# Patient Record
Sex: FEMALE | Race: WHITE | NOT HISPANIC OR LATINO | Employment: UNEMPLOYED | URBAN - METROPOLITAN AREA
[De-identification: names, ages, dates, MRNs, and addresses within clinical notes are randomized per-mention and may not be internally consistent; named-entity substitution may affect disease eponyms.]

---

## 2021-01-04 ENCOUNTER — HOSPITAL ENCOUNTER (EMERGENCY)
Facility: OTHER | Age: 31
Discharge: HOME | End: 2021-01-05
Attending: EMERGENCY MEDICINE
Payer: MEDICAID

## 2021-01-04 DIAGNOSIS — S56.922A: Primary | ICD-10-CM

## 2021-01-04 DIAGNOSIS — T14.90XA TRAUMA: ICD-10-CM

## 2021-01-04 DIAGNOSIS — S56.522A LACERATION OF EXTENSOR TENDON OF LEFT FOREARM, INITIAL ENCOUNTER: ICD-10-CM

## 2021-01-04 LAB
B-HCG UR QL: NEGATIVE
CTP QC/QA: YES

## 2021-01-04 PROCEDURE — 12002 RPR S/N/AX/GEN/TRNK2.6-7.5CM: CPT

## 2021-01-04 PROCEDURE — 90471 IMMUNIZATION ADMIN: CPT | Performed by: EMERGENCY MEDICINE

## 2021-01-04 PROCEDURE — 81025 URINE PREGNANCY TEST: CPT | Performed by: EMERGENCY MEDICINE

## 2021-01-04 PROCEDURE — 63600175 PHARM REV CODE 636 W HCPCS: Performed by: EMERGENCY MEDICINE

## 2021-01-04 PROCEDURE — 25000003 PHARM REV CODE 250: Performed by: EMERGENCY MEDICINE

## 2021-01-04 PROCEDURE — 63600175 PHARM REV CODE 636 W HCPCS: Performed by: NURSE PRACTITIONER

## 2021-01-04 PROCEDURE — 90715 TDAP VACCINE 7 YRS/> IM: CPT | Performed by: EMERGENCY MEDICINE

## 2021-01-04 PROCEDURE — 99284 EMERGENCY DEPT VISIT MOD MDM: CPT | Mod: 25

## 2021-01-04 PROCEDURE — 96374 THER/PROPH/DIAG INJ IV PUSH: CPT

## 2021-01-04 PROCEDURE — 96375 TX/PRO/DX INJ NEW DRUG ADDON: CPT

## 2021-01-04 RX ORDER — LIDOCAINE HYDROCHLORIDE AND EPINEPHRINE 20; 10 MG/ML; UG/ML
10 INJECTION, SOLUTION INFILTRATION; PERINEURAL
Status: COMPLETED | OUTPATIENT
Start: 2021-01-04 | End: 2021-01-04

## 2021-01-04 RX ORDER — HYDROMORPHONE HYDROCHLORIDE 1 MG/ML
0.5 INJECTION, SOLUTION INTRAMUSCULAR; INTRAVENOUS; SUBCUTANEOUS
Status: COMPLETED | OUTPATIENT
Start: 2021-01-04 | End: 2021-01-04

## 2021-01-04 RX ADMIN — HYDROMORPHONE HYDROCHLORIDE 0.5 MG: 1 INJECTION, SOLUTION INTRAMUSCULAR; INTRAVENOUS; SUBCUTANEOUS at 10:01

## 2021-01-04 RX ADMIN — CLOSTRIDIUM TETANI TOXOID ANTIGEN (FORMALDEHYDE INACTIVATED), CORYNEBACTERIUM DIPHTHERIAE TOXOID ANTIGEN (FORMALDEHYDE INACTIVATED), BORDETELLA PERTUSSIS TOXOID ANTIGEN (GLUTARALDEHYDE INACTIVATED), BORDETELLA PERTUSSIS FILAMENTOUS HEMAGGLUTININ ANTIGEN (FORMALDEHYDE INACTIVATED), BORDETELLA PERTUSSIS PERTACTIN ANTIGEN, AND BORDETELLA PERTUSSIS FIMBRIAE 2/3 ANTIGEN 0.5 ML: 5; 2; 2.5; 5; 3; 5 INJECTION, SUSPENSION INTRAMUSCULAR at 10:01

## 2021-01-04 RX ADMIN — LIDOCAINE-EPINEPHRINE-TETRACAINE GEL 4-0.05-0.5% 1 ML: 4-0.05-0.5 GEL at 11:01

## 2021-01-04 RX ADMIN — LORAZEPAM 1 MG: 2 INJECTION INTRAMUSCULAR; INTRAVENOUS at 11:01

## 2021-01-04 RX ADMIN — LIDOCAINE HYDROCHLORIDE,EPINEPHRINE BITARTRATE 10 ML: 20; .01 INJECTION, SOLUTION INFILTRATION; PERINEURAL at 10:01

## 2021-01-05 ENCOUNTER — TELEPHONE (OUTPATIENT)
Dept: ORTHOPEDICS | Facility: CLINIC | Age: 31
End: 2021-01-05

## 2021-01-05 VITALS
DIASTOLIC BLOOD PRESSURE: 70 MMHG | HEART RATE: 78 BPM | TEMPERATURE: 99 F | RESPIRATION RATE: 18 BRPM | OXYGEN SATURATION: 98 % | SYSTOLIC BLOOD PRESSURE: 159 MMHG

## 2021-01-05 PROCEDURE — 12002 RPR S/N/AX/GEN/TRNK2.6-7.5CM: CPT

## 2021-01-05 RX ORDER — OXYCODONE AND ACETAMINOPHEN 5; 325 MG/1; MG/1
1 TABLET ORAL EVERY 4 HOURS PRN
Qty: 8 TABLET | Refills: 0 | Status: ON HOLD | OUTPATIENT
Start: 2021-01-05 | End: 2021-01-08

## 2021-01-06 DIAGNOSIS — S51.802A FLEXOR TENDON LACERATION OF FOREARM WITH OPEN WOUND, LEFT, INITIAL ENCOUNTER: Primary | ICD-10-CM

## 2021-01-06 DIAGNOSIS — S56.222A: ICD-10-CM

## 2021-01-06 DIAGNOSIS — S56.222A FLEXOR TENDON LACERATION OF FOREARM WITH OPEN WOUND, LEFT, INITIAL ENCOUNTER: Primary | ICD-10-CM

## 2021-01-06 DIAGNOSIS — S51.802A: ICD-10-CM

## 2021-01-08 ENCOUNTER — ANESTHESIA (OUTPATIENT)
Dept: SURGERY | Facility: HOSPITAL | Age: 31
End: 2021-01-08
Payer: MEDICAID

## 2021-01-08 ENCOUNTER — ANESTHESIA EVENT (OUTPATIENT)
Dept: SURGERY | Facility: HOSPITAL | Age: 31
End: 2021-01-08
Payer: MEDICAID

## 2021-01-08 ENCOUNTER — HOSPITAL ENCOUNTER (OUTPATIENT)
Facility: HOSPITAL | Age: 31
Discharge: HOME | End: 2021-01-08
Attending: ORTHOPAEDIC SURGERY | Admitting: ORTHOPAEDIC SURGERY
Payer: MEDICAID

## 2021-01-08 VITALS
BODY MASS INDEX: 28.35 KG/M2 | SYSTOLIC BLOOD PRESSURE: 134 MMHG | WEIGHT: 160 LBS | RESPIRATION RATE: 16 BRPM | HEART RATE: 72 BPM | TEMPERATURE: 98 F | OXYGEN SATURATION: 98 % | HEIGHT: 63 IN | DIASTOLIC BLOOD PRESSURE: 74 MMHG

## 2021-01-08 DIAGNOSIS — S56.222A: ICD-10-CM

## 2021-01-08 DIAGNOSIS — S51.802A FLEXOR TENDON LACERATION OF FOREARM WITH OPEN WOUND, LEFT, INITIAL ENCOUNTER: ICD-10-CM

## 2021-01-08 DIAGNOSIS — S51.802A: ICD-10-CM

## 2021-01-08 DIAGNOSIS — S56.222A FLEXOR TENDON LACERATION OF FOREARM WITH OPEN WOUND, LEFT, INITIAL ENCOUNTER: ICD-10-CM

## 2021-01-08 LAB
ANION GAP SERPL CALC-SCNC: 7 MMOL/L (ref 8–16)
B-HCG UR QL: NEGATIVE
BUN SERPL-MCNC: 10 MG/DL (ref 6–20)
CALCIUM SERPL-MCNC: 8.2 MG/DL (ref 8.7–10.5)
CHLORIDE SERPL-SCNC: 105 MMOL/L (ref 95–110)
CO2 SERPL-SCNC: 24 MMOL/L (ref 23–29)
CREAT SERPL-MCNC: 0.7 MG/DL (ref 0.5–1.4)
CTP QC/QA: YES
EST. GFR  (AFRICAN AMERICAN): >60 ML/MIN/1.73 M^2
EST. GFR  (NON AFRICAN AMERICAN): >60 ML/MIN/1.73 M^2
GLUCOSE SERPL-MCNC: 75 MG/DL (ref 70–110)
POTASSIUM SERPL-SCNC: 3.9 MMOL/L (ref 3.5–5.1)
SARS-COV-2 RDRP RESP QL NAA+PROBE: NEGATIVE
SODIUM SERPL-SCNC: 136 MMOL/L (ref 136–145)

## 2021-01-08 PROCEDURE — 36415 COLL VENOUS BLD VENIPUNCTURE: CPT

## 2021-01-08 PROCEDURE — 37000008 HC ANESTHESIA 1ST 15 MINUTES: Performed by: ORTHOPAEDIC SURGERY

## 2021-01-08 PROCEDURE — 01810 ANES PX NRV MUSC F/ARM WRST: CPT | Performed by: ORTHOPAEDIC SURGERY

## 2021-01-08 PROCEDURE — 26350 PR REPAIR FLEXOR TENDON,HAND,W/O GRAFT,EA: ICD-10-PCS | Mod: 51,F2,, | Performed by: ORTHOPAEDIC SURGERY

## 2021-01-08 PROCEDURE — 87015 SPECIMEN INFECT AGNT CONCNTJ: CPT

## 2021-01-08 PROCEDURE — 27200664 HC NERVE BLOCK COMPLETE KIT: Performed by: STUDENT IN AN ORGANIZED HEALTH CARE EDUCATION/TRAINING PROGRAM

## 2021-01-08 PROCEDURE — 25260 REPAIR FOREARM TENDON/MUSCLE: CPT | Mod: 51,LT,, | Performed by: ORTHOPAEDIC SURGERY

## 2021-01-08 PROCEDURE — 63600175 PHARM REV CODE 636 W HCPCS: Performed by: ORTHOPAEDIC SURGERY

## 2021-01-08 PROCEDURE — 87205 SMEAR GRAM STAIN: CPT

## 2021-01-08 PROCEDURE — 76942 ECHO GUIDE FOR BIOPSY: CPT | Performed by: STUDENT IN AN ORGANIZED HEALTH CARE EDUCATION/TRAINING PROGRAM

## 2021-01-08 PROCEDURE — 71000015 HC POSTOP RECOV 1ST HR: Performed by: ORTHOPAEDIC SURGERY

## 2021-01-08 PROCEDURE — 87075 CULTR BACTERIA EXCEPT BLOOD: CPT

## 2021-01-08 PROCEDURE — U0002 COVID-19 LAB TEST NON-CDC: HCPCS

## 2021-01-08 PROCEDURE — 87102 FUNGUS ISOLATION CULTURE: CPT

## 2021-01-08 PROCEDURE — 80048 BASIC METABOLIC PNL TOTAL CA: CPT

## 2021-01-08 PROCEDURE — 25260 PR REFOREARM TEND/MUSC,FLEX,PRIM,EA: ICD-10-PCS | Mod: 51,LT,, | Performed by: ORTHOPAEDIC SURGERY

## 2021-01-08 PROCEDURE — 71000016 HC POSTOP RECOV ADDL HR: Performed by: ORTHOPAEDIC SURGERY

## 2021-01-08 PROCEDURE — 87070 CULTURE OTHR SPECIMN AEROBIC: CPT

## 2021-01-08 PROCEDURE — 36000706: Performed by: ORTHOPAEDIC SURGERY

## 2021-01-08 PROCEDURE — 26350 REPAIR FINGER/HAND TENDON: CPT | Mod: F1,,, | Performed by: ORTHOPAEDIC SURGERY

## 2021-01-08 PROCEDURE — 87116 MYCOBACTERIA CULTURE: CPT

## 2021-01-08 PROCEDURE — 63600175 PHARM REV CODE 636 W HCPCS: Performed by: STUDENT IN AN ORGANIZED HEALTH CARE EDUCATION/TRAINING PROGRAM

## 2021-01-08 PROCEDURE — 37000009 HC ANESTHESIA EA ADD 15 MINS: Performed by: ORTHOPAEDIC SURGERY

## 2021-01-08 PROCEDURE — 25000003 PHARM REV CODE 250: Performed by: STUDENT IN AN ORGANIZED HEALTH CARE EDUCATION/TRAINING PROGRAM

## 2021-01-08 PROCEDURE — 36000707: Performed by: ORTHOPAEDIC SURGERY

## 2021-01-08 PROCEDURE — 81025 URINE PREGNANCY TEST: CPT | Performed by: ORTHOPAEDIC SURGERY

## 2021-01-08 PROCEDURE — 63600175 PHARM REV CODE 636 W HCPCS

## 2021-01-08 PROCEDURE — 87206 SMEAR FLUORESCENT/ACID STAI: CPT

## 2021-01-08 RX ORDER — CEFAZOLIN SODIUM 2 G/50ML
2 SOLUTION INTRAVENOUS
Status: DISCONTINUED | OUTPATIENT
Start: 2021-01-08 | End: 2021-01-08 | Stop reason: HOSPADM

## 2021-01-08 RX ORDER — LORAZEPAM 2 MG/ML
1 INJECTION INTRAMUSCULAR ONCE
Status: DISCONTINUED | OUTPATIENT
Start: 2021-01-08 | End: 2021-01-08 | Stop reason: HOSPADM

## 2021-01-08 RX ORDER — OXYCODONE HYDROCHLORIDE 5 MG/1
5 TABLET ORAL EVERY 4 HOURS PRN
Qty: 40 TABLET | Refills: 0 | Status: SHIPPED | OUTPATIENT
Start: 2021-01-08

## 2021-01-08 RX ORDER — OXYCODONE AND ACETAMINOPHEN 5; 325 MG/1; MG/1
1 TABLET ORAL EVERY 4 HOURS PRN
Qty: 8 TABLET | Refills: 0 | Status: SHIPPED | OUTPATIENT
Start: 2021-01-08 | End: 2021-01-10

## 2021-01-08 RX ORDER — OXYCODONE HYDROCHLORIDE 5 MG/1
5 TABLET ORAL EVERY 4 HOURS PRN
Status: CANCELLED | OUTPATIENT
Start: 2021-01-08

## 2021-01-08 RX ORDER — OXYCODONE HYDROCHLORIDE 5 MG/1
10 TABLET ORAL EVERY 4 HOURS PRN
Status: CANCELLED | OUTPATIENT
Start: 2021-01-08

## 2021-01-08 RX ORDER — ONDANSETRON 8 MG/1
8 TABLET, ORALLY DISINTEGRATING ORAL EVERY 8 HOURS PRN
Status: CANCELLED | OUTPATIENT
Start: 2021-01-08

## 2021-01-08 RX ORDER — SODIUM CHLORIDE, SODIUM LACTATE, POTASSIUM CHLORIDE, CALCIUM CHLORIDE 600; 310; 30; 20 MG/100ML; MG/100ML; MG/100ML; MG/100ML
INJECTION, SOLUTION INTRAVENOUS CONTINUOUS PRN
Status: DISCONTINUED | OUTPATIENT
Start: 2021-01-08 | End: 2021-01-08

## 2021-01-08 RX ORDER — PROPOFOL 10 MG/ML
VIAL (ML) INTRAVENOUS CONTINUOUS PRN
Status: DISCONTINUED | OUTPATIENT
Start: 2021-01-08 | End: 2021-01-08

## 2021-01-08 RX ORDER — LORAZEPAM 2 MG/ML
INJECTION INTRAMUSCULAR
Status: COMPLETED
Start: 2021-01-08 | End: 2021-01-08

## 2021-01-08 RX ORDER — KETAMINE HCL IN 0.9 % NACL 50 MG/5 ML
SYRINGE (ML) INTRAVENOUS
Status: DISCONTINUED | OUTPATIENT
Start: 2021-01-08 | End: 2021-01-08

## 2021-01-08 RX ADMIN — SODIUM CHLORIDE, SODIUM LACTATE, POTASSIUM CHLORIDE, AND CALCIUM CHLORIDE: .6; .31; .03; .02 INJECTION, SOLUTION INTRAVENOUS at 01:01

## 2021-01-08 RX ADMIN — SODIUM CHLORIDE, SODIUM LACTATE, POTASSIUM CHLORIDE, AND CALCIUM CHLORIDE: .6; .31; .03; .02 INJECTION, SOLUTION INTRAVENOUS at 02:01

## 2021-01-08 RX ADMIN — Medication 50 MG: at 02:01

## 2021-01-08 RX ADMIN — PROPOFOL 250 MCG/KG/MIN: 10 INJECTION, EMULSION INTRAVENOUS at 01:01

## 2021-01-08 RX ADMIN — CEFAZOLIN SODIUM 2 G: 2 SOLUTION INTRAVENOUS at 02:01

## 2021-01-08 RX ADMIN — LORAZEPAM 1 MG: 2 INJECTION INTRAMUSCULAR; INTRAVENOUS at 03:01

## 2021-01-09 LAB
GRAM STN SPEC: NORMAL
GRAM STN SPEC: NORMAL

## 2021-01-11 LAB — BACTERIA SPEC AEROBE CULT: NORMAL

## 2021-01-13 LAB — BACTERIA SPEC ANAEROBE CULT: NORMAL

## 2021-01-20 ENCOUNTER — OFFICE VISIT (OUTPATIENT)
Dept: ORTHOPEDICS | Facility: CLINIC | Age: 31
End: 2021-01-20
Payer: MEDICAID

## 2021-01-20 VITALS
DIASTOLIC BLOOD PRESSURE: 83 MMHG | BODY MASS INDEX: 28.36 KG/M2 | WEIGHT: 160.06 LBS | SYSTOLIC BLOOD PRESSURE: 119 MMHG | HEIGHT: 63 IN | HEART RATE: 85 BPM

## 2021-01-20 DIAGNOSIS — S51.802A FLEXOR TENDON LACERATION OF LEFT FOREARM WITH OPEN WOUND, INITIAL ENCOUNTER: Primary | ICD-10-CM

## 2021-01-20 DIAGNOSIS — S56.222A FLEXOR TENDON LACERATION OF LEFT FOREARM WITH OPEN WOUND, INITIAL ENCOUNTER: Primary | ICD-10-CM

## 2021-01-20 PROCEDURE — 99999 PR PBB SHADOW E&M-EST. PATIENT-LVL III: CPT | Mod: PBBFAC,,, | Performed by: PHYSICIAN ASSISTANT

## 2021-01-20 PROCEDURE — 99213 OFFICE O/P EST LOW 20 MIN: CPT | Mod: PBBFAC,PN | Performed by: PHYSICIAN ASSISTANT

## 2021-01-20 PROCEDURE — 99999 PR PBB SHADOW E&M-EST. PATIENT-LVL III: ICD-10-PCS | Mod: PBBFAC,,, | Performed by: PHYSICIAN ASSISTANT

## 2021-01-20 PROCEDURE — 99024 POSTOP FOLLOW-UP VISIT: CPT | Mod: ,,, | Performed by: PHYSICIAN ASSISTANT

## 2021-01-20 PROCEDURE — 99024 PR POST-OP FOLLOW-UP VISIT: ICD-10-PCS | Mod: ,,, | Performed by: PHYSICIAN ASSISTANT

## 2021-02-08 LAB — FUNGUS SPEC CULT: NORMAL

## 2021-03-13 LAB
ACID FAST MOD KINY STN SPEC: NORMAL
MYCOBACTERIUM SPEC QL CULT: NORMAL

## 2025-01-29 ENCOUNTER — HOSPITAL ENCOUNTER (OUTPATIENT)
Facility: OTHER | Age: 35
Discharge: LEFT BEFORE DISCHARGE | End: 2025-01-30
Attending: EMERGENCY MEDICINE | Admitting: EMERGENCY MEDICINE
Payer: MEDICAID

## 2025-01-29 DIAGNOSIS — L02.01 FACIAL ABSCESS: Primary | ICD-10-CM

## 2025-01-29 DIAGNOSIS — K12.2 SUBMANDIBULAR ABSCESS: ICD-10-CM

## 2025-01-29 DIAGNOSIS — L02.11 NECK ABSCESS: ICD-10-CM

## 2025-01-29 LAB
ALBUMIN SERPL BCP-MCNC: 2.9 G/DL (ref 3.5–5.2)
ALP SERPL-CCNC: 146 U/L (ref 40–150)
ALT SERPL W/O P-5'-P-CCNC: 17 U/L (ref 10–44)
ANION GAP SERPL CALC-SCNC: 9 MMOL/L (ref 8–16)
AST SERPL-CCNC: 23 U/L (ref 10–40)
BASOPHILS # BLD AUTO: 0.02 K/UL (ref 0–0.2)
BASOPHILS NFR BLD: 0.3 % (ref 0–1.9)
BILIRUB SERPL-MCNC: 0.4 MG/DL (ref 0.1–1)
BUN SERPL-MCNC: 15 MG/DL (ref 6–20)
CALCIUM SERPL-MCNC: 9.4 MG/DL (ref 8.7–10.5)
CHLORIDE SERPL-SCNC: 99 MMOL/L (ref 95–110)
CO2 SERPL-SCNC: 26 MMOL/L (ref 23–29)
CREAT SERPL-MCNC: 0.7 MG/DL (ref 0.5–1.4)
CRP SERPL-MCNC: 78.7 MG/L (ref 0–8.2)
DIFFERENTIAL METHOD BLD: ABNORMAL
EOSINOPHIL # BLD AUTO: 0 K/UL (ref 0–0.5)
EOSINOPHIL NFR BLD: 0.3 % (ref 0–8)
ERYTHROCYTE [DISTWIDTH] IN BLOOD BY AUTOMATED COUNT: 13.2 % (ref 11.5–14.5)
EST. GFR  (NO RACE VARIABLE): >60 ML/MIN/1.73 M^2
GLUCOSE SERPL-MCNC: 97 MG/DL (ref 70–110)
HCT VFR BLD AUTO: 33.7 % (ref 37–48.5)
HCV AB SERPL QL IA: POSITIVE
HGB BLD-MCNC: 11.6 G/DL (ref 12–16)
HIV 1+2 AB+HIV1 P24 AG SERPL QL IA: POSITIVE
IMM GRANULOCYTES # BLD AUTO: 0.04 K/UL (ref 0–0.04)
IMM GRANULOCYTES NFR BLD AUTO: 0.6 % (ref 0–0.5)
LYMPHOCYTES # BLD AUTO: 0.8 K/UL (ref 1–4.8)
LYMPHOCYTES NFR BLD: 13.1 % (ref 18–48)
MCH RBC QN AUTO: 27.7 PG (ref 27–31)
MCHC RBC AUTO-ENTMCNC: 34.4 G/DL (ref 32–36)
MCV RBC AUTO: 80 FL (ref 82–98)
MONOCYTES # BLD AUTO: 0.3 K/UL (ref 0.3–1)
MONOCYTES NFR BLD: 4.7 % (ref 4–15)
NEUTROPHILS # BLD AUTO: 5 K/UL (ref 1.8–7.7)
NEUTROPHILS NFR BLD: 81 % (ref 38–73)
NRBC BLD-RTO: 0 /100 WBC
PLATELET # BLD AUTO: 247 K/UL (ref 150–450)
PMV BLD AUTO: 8.2 FL (ref 9.2–12.9)
POTASSIUM SERPL-SCNC: 3.5 MMOL/L (ref 3.5–5.1)
PROT SERPL-MCNC: 9.4 G/DL (ref 6–8.4)
RBC # BLD AUTO: 4.19 M/UL (ref 4–5.4)
SODIUM SERPL-SCNC: 134 MMOL/L (ref 136–145)
WBC # BLD AUTO: 6.16 K/UL (ref 3.9–12.7)

## 2025-01-29 PROCEDURE — 86803 HEPATITIS C AB TEST: CPT | Performed by: EMERGENCY MEDICINE

## 2025-01-29 PROCEDURE — 99285 EMERGENCY DEPT VISIT HI MDM: CPT

## 2025-01-29 PROCEDURE — 84702 CHORIONIC GONADOTROPIN TEST: CPT | Performed by: EMERGENCY MEDICINE

## 2025-01-29 PROCEDURE — 85025 COMPLETE CBC W/AUTO DIFF WBC: CPT | Performed by: NURSE PRACTITIONER

## 2025-01-29 PROCEDURE — 80053 COMPREHEN METABOLIC PANEL: CPT | Performed by: NURSE PRACTITIONER

## 2025-01-29 PROCEDURE — 87389 HIV-1 AG W/HIV-1&-2 AB AG IA: CPT | Mod: 91 | Performed by: EMERGENCY MEDICINE

## 2025-01-29 PROCEDURE — 87522 HEPATITIS C REVRS TRNSCRPJ: CPT | Performed by: EMERGENCY MEDICINE

## 2025-01-29 PROCEDURE — 87040 BLOOD CULTURE FOR BACTERIA: CPT | Mod: 59

## 2025-01-29 PROCEDURE — 87389 HIV-1 AG W/HIV-1&-2 AB AG IA: CPT | Performed by: EMERGENCY MEDICINE

## 2025-01-29 PROCEDURE — 86140 C-REACTIVE PROTEIN: CPT | Performed by: NURSE PRACTITIONER

## 2025-01-29 RX ORDER — HYDROMORPHONE HYDROCHLORIDE 1 MG/ML
1 INJECTION, SOLUTION INTRAMUSCULAR; INTRAVENOUS; SUBCUTANEOUS
Status: COMPLETED | OUTPATIENT
Start: 2025-01-29 | End: 2025-01-30

## 2025-01-29 RX ORDER — DEXAMETHASONE SODIUM PHOSPHATE 4 MG/ML
12 INJECTION, SOLUTION INTRA-ARTICULAR; INTRALESIONAL; INTRAMUSCULAR; INTRAVENOUS; SOFT TISSUE
Status: COMPLETED | OUTPATIENT
Start: 2025-01-29 | End: 2025-01-30

## 2025-01-29 NOTE — Clinical Note
Diagnosis: Facial abscess [926086]   Future Attending Provider: AZAM MEJÍA [[id number]]   Is the patient being sent to ED Observation?: No   Special Needs:: No Special Needs [1]

## 2025-01-30 ENCOUNTER — ANESTHESIA EVENT (OUTPATIENT)
Dept: SURGERY | Facility: OTHER | Age: 35
End: 2025-01-30
Payer: MEDICAID

## 2025-01-30 ENCOUNTER — ANESTHESIA (OUTPATIENT)
Dept: SURGERY | Facility: OTHER | Age: 35
End: 2025-01-30
Payer: MEDICAID

## 2025-01-30 VITALS
RESPIRATION RATE: 18 BRPM | HEIGHT: 62 IN | WEIGHT: 130.06 LBS | HEART RATE: 60 BPM | BODY MASS INDEX: 23.93 KG/M2 | SYSTOLIC BLOOD PRESSURE: 149 MMHG | DIASTOLIC BLOOD PRESSURE: 78 MMHG | OXYGEN SATURATION: 96 % | TEMPERATURE: 98 F

## 2025-01-30 PROBLEM — B19.20 HEPATITIS C VIRUS INFECTION, UNSPECIFIED CHRONICITY: Status: ACTIVE | Noted: 2021-07-01

## 2025-01-30 PROBLEM — F32.A DEPRESSIVE DISORDER: Status: ACTIVE | Noted: 2025-01-30

## 2025-01-30 PROBLEM — K12.2 SUBMANDIBULAR ABSCESS: Status: ACTIVE | Noted: 2025-01-30

## 2025-01-30 PROBLEM — F19.90 IVDU (INTRAVENOUS DRUG USER): Status: ACTIVE | Noted: 2021-07-01

## 2025-01-30 PROBLEM — F20.9 SCHIZOPHRENIA: Status: ACTIVE | Noted: 2025-01-30

## 2025-01-30 PROBLEM — Z21 HIV (HUMAN IMMUNODEFICIENCY VIRUS INFECTION): Status: ACTIVE | Noted: 2021-12-09

## 2025-01-30 PROBLEM — F19.11 HISTORY OF DRUG ABUSE: Status: ACTIVE | Noted: 2025-01-30

## 2025-01-30 PROBLEM — Z86.79 H/O ENDOCARDITIS: Status: ACTIVE | Noted: 2021-10-26

## 2025-01-30 PROBLEM — F41.9 ANXIETY: Status: ACTIVE | Noted: 2025-01-30

## 2025-01-30 LAB
HCG INTACT+B SERPL-ACNC: <1.2 MIU/ML
HCV RNA SERPL NAA+PROBE-LOG IU: 6.62 LOGIU/ML
HCV RNA SERPL QL NAA+PROBE: DETECTED
HCV RNA SPEC NAA+PROBE-ACNC: ABNORMAL IU/ML
HIV SUPPLEMENTAL ASSAY INTERPRETATION: ABNORMAL
HIV-1 RESULT: POSITIVE
HIV-2 RESULT: NEGATIVE

## 2025-01-30 PROCEDURE — 25500020 PHARM REV CODE 255

## 2025-01-30 PROCEDURE — 63600175 PHARM REV CODE 636 W HCPCS: Performed by: INTERNAL MEDICINE

## 2025-01-30 PROCEDURE — 87116 MYCOBACTERIA CULTURE: CPT | Performed by: STUDENT IN AN ORGANIZED HEALTH CARE EDUCATION/TRAINING PROGRAM

## 2025-01-30 PROCEDURE — 96375 TX/PRO/DX INJ NEW DRUG ADDON: CPT

## 2025-01-30 PROCEDURE — 25000003 PHARM REV CODE 250: Performed by: NURSE ANESTHETIST, CERTIFIED REGISTERED

## 2025-01-30 PROCEDURE — 25000003 PHARM REV CODE 250: Performed by: INTERNAL MEDICINE

## 2025-01-30 PROCEDURE — 96372 THER/PROPH/DIAG INJ SC/IM: CPT | Performed by: INTERNAL MEDICINE

## 2025-01-30 PROCEDURE — 36410 VNPNXR 3YR/> PHY/QHP DX/THER: CPT

## 2025-01-30 PROCEDURE — 96365 THER/PROPH/DIAG IV INF INIT: CPT | Mod: 59

## 2025-01-30 PROCEDURE — 96366 THER/PROPH/DIAG IV INF ADDON: CPT

## 2025-01-30 PROCEDURE — 38510 BIOPSY/REMOVAL LYMPH NODES: CPT | Mod: RT,,, | Performed by: STUDENT IN AN ORGANIZED HEALTH CARE EDUCATION/TRAINING PROGRAM

## 2025-01-30 PROCEDURE — 63600175 PHARM REV CODE 636 W HCPCS: Performed by: STUDENT IN AN ORGANIZED HEALTH CARE EDUCATION/TRAINING PROGRAM

## 2025-01-30 PROCEDURE — 36000705 HC OR TIME LEV I EA ADD 15 MIN: Performed by: STUDENT IN AN ORGANIZED HEALTH CARE EDUCATION/TRAINING PROGRAM

## 2025-01-30 PROCEDURE — 25000003 PHARM REV CODE 250: Performed by: PHYSICIAN ASSISTANT

## 2025-01-30 PROCEDURE — G0378 HOSPITAL OBSERVATION PER HR: HCPCS

## 2025-01-30 PROCEDURE — 88305 TISSUE EXAM BY PATHOLOGIST: CPT | Mod: 26,,, | Performed by: PATHOLOGY

## 2025-01-30 PROCEDURE — 25000003 PHARM REV CODE 250

## 2025-01-30 PROCEDURE — 63600175 PHARM REV CODE 636 W HCPCS: Performed by: PHYSICIAN ASSISTANT

## 2025-01-30 PROCEDURE — 63600175 PHARM REV CODE 636 W HCPCS: Performed by: NURSE ANESTHETIST, CERTIFIED REGISTERED

## 2025-01-30 PROCEDURE — 71000033 HC RECOVERY, INTIAL HOUR: Performed by: STUDENT IN AN ORGANIZED HEALTH CARE EDUCATION/TRAINING PROGRAM

## 2025-01-30 PROCEDURE — 96367 TX/PROPH/DG ADDL SEQ IV INF: CPT

## 2025-01-30 PROCEDURE — 96376 TX/PRO/DX INJ SAME DRUG ADON: CPT

## 2025-01-30 PROCEDURE — 63600175 PHARM REV CODE 636 W HCPCS

## 2025-01-30 PROCEDURE — 96365 THER/PROPH/DIAG IV INF INIT: CPT

## 2025-01-30 PROCEDURE — 88189 FLOWCYTOMETRY/READ 16 & >: CPT | Mod: ,,, | Performed by: PATHOLOGY

## 2025-01-30 PROCEDURE — 27201423 OPTIME MED/SURG SUP & DEVICES STERILE SUPPLY: Performed by: STUDENT IN AN ORGANIZED HEALTH CARE EDUCATION/TRAINING PROGRAM

## 2025-01-30 PROCEDURE — 88305 TISSUE EXAM BY PATHOLOGIST: CPT | Performed by: PATHOLOGY

## 2025-01-30 PROCEDURE — 87102 FUNGUS ISOLATION CULTURE: CPT | Performed by: STUDENT IN AN ORGANIZED HEALTH CARE EDUCATION/TRAINING PROGRAM

## 2025-01-30 PROCEDURE — 37000009 HC ANESTHESIA EA ADD 15 MINS: Performed by: STUDENT IN AN ORGANIZED HEALTH CARE EDUCATION/TRAINING PROGRAM

## 2025-01-30 PROCEDURE — 88185 FLOWCYTOMETRY/TC ADD-ON: CPT | Performed by: PATHOLOGY

## 2025-01-30 PROCEDURE — 88312 SPECIAL STAINS GROUP 1: CPT | Mod: 59 | Performed by: PATHOLOGY

## 2025-01-30 PROCEDURE — 87075 CULTR BACTERIA EXCEPT BLOOD: CPT | Performed by: STUDENT IN AN ORGANIZED HEALTH CARE EDUCATION/TRAINING PROGRAM

## 2025-01-30 PROCEDURE — 25000003 PHARM REV CODE 250: Performed by: STUDENT IN AN ORGANIZED HEALTH CARE EDUCATION/TRAINING PROGRAM

## 2025-01-30 PROCEDURE — 87070 CULTURE OTHR SPECIMN AEROBIC: CPT | Performed by: STUDENT IN AN ORGANIZED HEALTH CARE EDUCATION/TRAINING PROGRAM

## 2025-01-30 PROCEDURE — 63600175 PHARM REV CODE 636 W HCPCS: Mod: TB,JZ | Performed by: ANESTHESIOLOGY

## 2025-01-30 PROCEDURE — 71000039 HC RECOVERY, EACH ADD'L HOUR: Performed by: STUDENT IN AN ORGANIZED HEALTH CARE EDUCATION/TRAINING PROGRAM

## 2025-01-30 PROCEDURE — 87205 SMEAR GRAM STAIN: CPT | Performed by: STUDENT IN AN ORGANIZED HEALTH CARE EDUCATION/TRAINING PROGRAM

## 2025-01-30 PROCEDURE — 87206 SMEAR FLUORESCENT/ACID STAI: CPT | Performed by: STUDENT IN AN ORGANIZED HEALTH CARE EDUCATION/TRAINING PROGRAM

## 2025-01-30 PROCEDURE — 36000704 HC OR TIME LEV I 1ST 15 MIN: Performed by: STUDENT IN AN ORGANIZED HEALTH CARE EDUCATION/TRAINING PROGRAM

## 2025-01-30 PROCEDURE — 88184 FLOWCYTOMETRY/ TC 1 MARKER: CPT | Performed by: PATHOLOGY

## 2025-01-30 PROCEDURE — 96375 TX/PRO/DX INJ NEW DRUG ADDON: CPT | Mod: 59

## 2025-01-30 PROCEDURE — 99204 OFFICE O/P NEW MOD 45 MIN: CPT | Mod: 25,,, | Performed by: STUDENT IN AN ORGANIZED HEALTH CARE EDUCATION/TRAINING PROGRAM

## 2025-01-30 PROCEDURE — C1751 CATH, INF, PER/CENT/MIDLINE: HCPCS

## 2025-01-30 PROCEDURE — 37000008 HC ANESTHESIA 1ST 15 MINUTES: Performed by: STUDENT IN AN ORGANIZED HEALTH CARE EDUCATION/TRAINING PROGRAM

## 2025-01-30 PROCEDURE — 88312 SPECIAL STAINS GROUP 1: CPT | Mod: 26,,, | Performed by: PATHOLOGY

## 2025-01-30 PROCEDURE — C1729 CATH, DRAINAGE: HCPCS | Performed by: STUDENT IN AN ORGANIZED HEALTH CARE EDUCATION/TRAINING PROGRAM

## 2025-01-30 RX ORDER — OXYCODONE HYDROCHLORIDE 5 MG/1
5 TABLET ORAL EVERY 4 HOURS PRN
Status: DISCONTINUED | OUTPATIENT
Start: 2025-01-30 | End: 2025-01-30 | Stop reason: HOSPADM

## 2025-01-30 RX ORDER — GLUCAGON 1 MG
1 KIT INJECTION
Status: DISCONTINUED | OUTPATIENT
Start: 2025-01-30 | End: 2025-01-30 | Stop reason: HOSPADM

## 2025-01-30 RX ORDER — ELVITEGRAVIR, COBICISTAT, EMTRICITABINE, AND TENOFOVIR DISOPROXIL FUMARATE 150; 150; 200; 300 MG/1; MG/1; MG/1; MG/1
1 TABLET, FILM COATED ORAL DAILY
COMMUNITY

## 2025-01-30 RX ORDER — TALC
6 POWDER (GRAM) TOPICAL NIGHTLY PRN
Status: DISCONTINUED | OUTPATIENT
Start: 2025-01-30 | End: 2025-01-30 | Stop reason: HOSPADM

## 2025-01-30 RX ORDER — BUPRENORPHINE HYDROCHLORIDE 8 MG/1
8 TABLET SUBLINGUAL DAILY
COMMUNITY

## 2025-01-30 RX ORDER — SODIUM CHLORIDE 0.9 % (FLUSH) 0.9 %
10 SYRINGE (ML) INJECTION
Status: DISCONTINUED | OUTPATIENT
Start: 2025-01-30 | End: 2025-01-30 | Stop reason: HOSPADM

## 2025-01-30 RX ORDER — OXYCODONE HYDROCHLORIDE 5 MG/1
5 TABLET ORAL
Status: DISCONTINUED | OUTPATIENT
Start: 2025-01-30 | End: 2025-01-30 | Stop reason: HOSPADM

## 2025-01-30 RX ORDER — MIRTAZAPINE 15 MG/1
15 TABLET, FILM COATED ORAL NIGHTLY PRN
Status: DISCONTINUED | OUTPATIENT
Start: 2025-01-30 | End: 2025-01-30 | Stop reason: HOSPADM

## 2025-01-30 RX ORDER — DEXAMETHASONE SODIUM PHOSPHATE 4 MG/ML
INJECTION, SOLUTION INTRA-ARTICULAR; INTRALESIONAL; INTRAMUSCULAR; INTRAVENOUS; SOFT TISSUE
Status: DISCONTINUED | OUTPATIENT
Start: 2025-01-30 | End: 2025-01-30

## 2025-01-30 RX ORDER — MIDAZOLAM HYDROCHLORIDE 1 MG/ML
INJECTION INTRAMUSCULAR; INTRAVENOUS
Status: DISCONTINUED | OUTPATIENT
Start: 2025-01-30 | End: 2025-01-30

## 2025-01-30 RX ORDER — MEPERIDINE HYDROCHLORIDE 25 MG/ML
12.5 INJECTION INTRAMUSCULAR; INTRAVENOUS; SUBCUTANEOUS ONCE AS NEEDED
Status: DISCONTINUED | OUTPATIENT
Start: 2025-01-30 | End: 2025-01-30 | Stop reason: HOSPADM

## 2025-01-30 RX ORDER — NALOXONE HCL 0.4 MG/ML
0.02 VIAL (ML) INJECTION
Status: DISCONTINUED | OUTPATIENT
Start: 2025-01-30 | End: 2025-01-30 | Stop reason: HOSPADM

## 2025-01-30 RX ORDER — ROCURONIUM BROMIDE 10 MG/ML
INJECTION, SOLUTION INTRAVENOUS
Status: DISCONTINUED | OUTPATIENT
Start: 2025-01-30 | End: 2025-01-30

## 2025-01-30 RX ORDER — HYDROMORPHONE HYDROCHLORIDE 2 MG/ML
0.4 INJECTION, SOLUTION INTRAMUSCULAR; INTRAVENOUS; SUBCUTANEOUS EVERY 5 MIN PRN
Status: DISCONTINUED | OUTPATIENT
Start: 2025-01-30 | End: 2025-01-30 | Stop reason: HOSPADM

## 2025-01-30 RX ORDER — PROCHLORPERAZINE EDISYLATE 5 MG/ML
5 INJECTION INTRAMUSCULAR; INTRAVENOUS EVERY 30 MIN PRN
Status: COMPLETED | OUTPATIENT
Start: 2025-01-30 | End: 2025-01-30

## 2025-01-30 RX ORDER — FENTANYL CITRATE 50 UG/ML
INJECTION, SOLUTION INTRAMUSCULAR; INTRAVENOUS
Status: DISCONTINUED | OUTPATIENT
Start: 2025-01-30 | End: 2025-01-30

## 2025-01-30 RX ORDER — SODIUM CHLORIDE 0.9 % (FLUSH) 0.9 %
10 SYRINGE (ML) INJECTION EVERY 8 HOURS
Status: DISCONTINUED | OUTPATIENT
Start: 2025-01-30 | End: 2025-01-30

## 2025-01-30 RX ORDER — KETAMINE HYDROCHLORIDE 50 MG/ML
INJECTION, SOLUTION INTRAMUSCULAR; INTRAVENOUS
Status: DISCONTINUED | OUTPATIENT
Start: 2025-01-30 | End: 2025-01-30

## 2025-01-30 RX ORDER — HYDROMORPHONE HYDROCHLORIDE 2 MG/ML
INJECTION, SOLUTION INTRAMUSCULAR; INTRAVENOUS; SUBCUTANEOUS
Status: DISCONTINUED | OUTPATIENT
Start: 2025-01-30 | End: 2025-01-30

## 2025-01-30 RX ORDER — ACETAMINOPHEN 325 MG/1
650 TABLET ORAL EVERY 6 HOURS PRN
Status: DISCONTINUED | OUTPATIENT
Start: 2025-01-30 | End: 2025-01-30 | Stop reason: HOSPADM

## 2025-01-30 RX ORDER — OLANZAPINE 10 MG/2ML
5 INJECTION, POWDER, FOR SOLUTION INTRAMUSCULAR EVERY 8 HOURS PRN
Status: DISCONTINUED | OUTPATIENT
Start: 2025-01-30 | End: 2025-01-30 | Stop reason: HOSPADM

## 2025-01-30 RX ORDER — HYDROXYZINE HYDROCHLORIDE 25 MG/1
50 TABLET, FILM COATED ORAL EVERY 6 HOURS PRN
Status: DISCONTINUED | OUTPATIENT
Start: 2025-01-30 | End: 2025-01-30 | Stop reason: HOSPADM

## 2025-01-30 RX ORDER — IBUPROFEN 200 MG
16 TABLET ORAL
Status: DISCONTINUED | OUTPATIENT
Start: 2025-01-30 | End: 2025-01-30 | Stop reason: HOSPADM

## 2025-01-30 RX ORDER — OXYCODONE HYDROCHLORIDE 10 MG/1
10 TABLET ORAL EVERY 4 HOURS PRN
Status: DISCONTINUED | OUTPATIENT
Start: 2025-01-30 | End: 2025-01-30 | Stop reason: HOSPADM

## 2025-01-30 RX ORDER — TRAZODONE HYDROCHLORIDE 100 MG/1
100 TABLET ORAL NIGHTLY PRN
COMMUNITY

## 2025-01-30 RX ORDER — TRAMADOL HYDROCHLORIDE 50 MG/1
50 TABLET ORAL EVERY 6 HOURS PRN
COMMUNITY

## 2025-01-30 RX ORDER — OLANZAPINE 10 MG/2ML
10 INJECTION, POWDER, FOR SOLUTION INTRAMUSCULAR ONCE
Status: COMPLETED | OUTPATIENT
Start: 2025-01-30 | End: 2025-01-30

## 2025-01-30 RX ORDER — DICYCLOMINE HYDROCHLORIDE 10 MG/1
10 CAPSULE ORAL EVERY 6 HOURS PRN
Status: DISCONTINUED | OUTPATIENT
Start: 2025-01-30 | End: 2025-01-30 | Stop reason: HOSPADM

## 2025-01-30 RX ORDER — IBUPROFEN 200 MG
24 TABLET ORAL
Status: DISCONTINUED | OUTPATIENT
Start: 2025-01-30 | End: 2025-01-30 | Stop reason: HOSPADM

## 2025-01-30 RX ORDER — SODIUM CHLORIDE 0.9 % (FLUSH) 0.9 %
10 SYRINGE (ML) INJECTION EVERY 12 HOURS PRN
Status: DISCONTINUED | OUTPATIENT
Start: 2025-01-30 | End: 2025-01-30 | Stop reason: HOSPADM

## 2025-01-30 RX ORDER — TRAZODONE HYDROCHLORIDE 50 MG/1
100 TABLET ORAL NIGHTLY PRN
Status: DISCONTINUED | OUTPATIENT
Start: 2025-01-30 | End: 2025-01-30 | Stop reason: HOSPADM

## 2025-01-30 RX ORDER — CLONIDINE HYDROCHLORIDE 0.1 MG/1
0.1 TABLET ORAL EVERY 4 HOURS PRN
Status: DISCONTINUED | OUTPATIENT
Start: 2025-01-30 | End: 2025-01-30 | Stop reason: HOSPADM

## 2025-01-30 RX ORDER — PROCHLORPERAZINE EDISYLATE 5 MG/ML
5 INJECTION INTRAMUSCULAR; INTRAVENOUS EVERY 30 MIN PRN
Status: DISCONTINUED | OUTPATIENT
Start: 2025-01-30 | End: 2025-01-30 | Stop reason: HOSPADM

## 2025-01-30 RX ORDER — VANCOMYCIN HYDROCHLORIDE 500 MG/10ML
INJECTION, POWDER, LYOPHILIZED, FOR SOLUTION INTRAVENOUS
Status: DISCONTINUED | OUTPATIENT
Start: 2025-01-30 | End: 2025-01-30 | Stop reason: HOSPADM

## 2025-01-30 RX ORDER — PROPOFOL 10 MG/ML
VIAL (ML) INTRAVENOUS
Status: DISCONTINUED | OUTPATIENT
Start: 2025-01-30 | End: 2025-01-30

## 2025-01-30 RX ORDER — DEXMEDETOMIDINE HYDROCHLORIDE 100 UG/ML
INJECTION, SOLUTION INTRAVENOUS
Status: DISCONTINUED | OUTPATIENT
Start: 2025-01-30 | End: 2025-01-30

## 2025-01-30 RX ORDER — ONDANSETRON HYDROCHLORIDE 2 MG/ML
INJECTION, SOLUTION INTRAVENOUS
Status: DISCONTINUED | OUTPATIENT
Start: 2025-01-30 | End: 2025-01-30

## 2025-01-30 RX ORDER — BACLOFEN 10 MG/1
10 TABLET ORAL EVERY 6 HOURS PRN
Status: DISCONTINUED | OUTPATIENT
Start: 2025-01-30 | End: 2025-01-30 | Stop reason: HOSPADM

## 2025-01-30 RX ORDER — DAPTOMYCIN 50 MG/ML
6 INJECTION, POWDER, LYOPHILIZED, FOR SOLUTION INTRAVENOUS
Status: DISCONTINUED | OUTPATIENT
Start: 2025-01-30 | End: 2025-01-30 | Stop reason: HOSPADM

## 2025-01-30 RX ORDER — AMOXICILLIN 250 MG
1 CAPSULE ORAL 2 TIMES DAILY PRN
Status: DISCONTINUED | OUTPATIENT
Start: 2025-01-30 | End: 2025-01-30 | Stop reason: HOSPADM

## 2025-01-30 RX ORDER — BICTEGRAVIR SODIUM, EMTRICITABINE, AND TENOFOVIR ALAFENAMIDE FUMARATE 50; 200; 25 MG/1; MG/1; MG/1
1 TABLET ORAL DAILY
COMMUNITY

## 2025-01-30 RX ORDER — SODIUM CHLORIDE 0.9 % (FLUSH) 0.9 %
3 SYRINGE (ML) INJECTION
Status: DISCONTINUED | OUTPATIENT
Start: 2025-01-30 | End: 2025-01-30 | Stop reason: HOSPADM

## 2025-01-30 RX ADMIN — PROCHLORPERAZINE EDISYLATE 5 MG: 5 INJECTION INTRAMUSCULAR; INTRAVENOUS at 02:01

## 2025-01-30 RX ADMIN — DEXMEDETOMIDINE HYDROCHLORIDE 12 MCG: 100 INJECTION, SOLUTION, CONCENTRATE INTRAVENOUS at 01:01

## 2025-01-30 RX ADMIN — PIPERACILLIN SODIUM AND TAZOBACTAM SODIUM 4.5 G: 4; .5 INJECTION, POWDER, LYOPHILIZED, FOR SOLUTION INTRAVENOUS at 12:01

## 2025-01-30 RX ADMIN — HYDROMORPHONE HYDROCHLORIDE 0.5 MG: 2 INJECTION INTRAMUSCULAR; INTRAVENOUS; SUBCUTANEOUS at 01:01

## 2025-01-30 RX ADMIN — MIDAZOLAM HYDROCHLORIDE 2 MG: 1 INJECTION INTRAMUSCULAR; INTRAVENOUS at 12:01

## 2025-01-30 RX ADMIN — HYDROMORPHONE HYDROCHLORIDE 1 MG: 1 INJECTION, SOLUTION INTRAMUSCULAR; INTRAVENOUS; SUBCUTANEOUS at 12:01

## 2025-01-30 RX ADMIN — PROPOFOL 200 MG: 10 INJECTION, EMULSION INTRAVENOUS at 12:01

## 2025-01-30 RX ADMIN — AMPICILLIN SODIUM AND SULBACTAM SODIUM 3 G: 2; 1 INJECTION, POWDER, FOR SOLUTION INTRAMUSCULAR; INTRAVENOUS at 10:01

## 2025-01-30 RX ADMIN — FENTANYL CITRATE 50 MCG: 50 INJECTION, SOLUTION INTRAMUSCULAR; INTRAVENOUS at 01:01

## 2025-01-30 RX ADMIN — CLONIDINE HYDROCHLORIDE 0.1 MG: 0.1 TABLET ORAL at 10:01

## 2025-01-30 RX ADMIN — OLANZAPINE 10 MG: 10 INJECTION, POWDER, FOR SOLUTION INTRAMUSCULAR at 02:01

## 2025-01-30 RX ADMIN — MIDAZOLAM HYDROCHLORIDE 2 MG: 1 INJECTION INTRAMUSCULAR; INTRAVENOUS at 01:01

## 2025-01-30 RX ADMIN — DEXAMETHASONE SODIUM PHOSPHATE 4 MG: 4 INJECTION, SOLUTION INTRAMUSCULAR; INTRAVENOUS at 12:01

## 2025-01-30 RX ADMIN — ONDANSETRON HYDROCHLORIDE 4 MG: 2 INJECTION INTRAMUSCULAR; INTRAVENOUS at 12:01

## 2025-01-30 RX ADMIN — DEXMEDETOMIDINE HYDROCHLORIDE 8 MCG: 100 INJECTION, SOLUTION, CONCENTRATE INTRAVENOUS at 01:01

## 2025-01-30 RX ADMIN — KETAMINE HYDROCHLORIDE 30 MG: 50 INJECTION, SOLUTION INTRAMUSCULAR; INTRAVENOUS at 12:01

## 2025-01-30 RX ADMIN — HYDROMORPHONE HYDROCHLORIDE 0.4 MG: 2 INJECTION INTRAMUSCULAR; INTRAVENOUS; SUBCUTANEOUS at 02:01

## 2025-01-30 RX ADMIN — VANCOMYCIN HYDROCHLORIDE 1250 MG: 1.25 INJECTION, POWDER, LYOPHILIZED, FOR SOLUTION INTRAVENOUS at 01:01

## 2025-01-30 RX ADMIN — PROPOFOL 30 MG: 10 INJECTION, EMULSION INTRAVENOUS at 01:01

## 2025-01-30 RX ADMIN — ROCURONIUM BROMIDE 50 MG: 10 INJECTION, SOLUTION INTRAVENOUS at 12:01

## 2025-01-30 RX ADMIN — DEXAMETHASONE SODIUM PHOSPHATE 12 MG: 4 INJECTION, SOLUTION INTRA-ARTICULAR; INTRALESIONAL; INTRAMUSCULAR; INTRAVENOUS; SOFT TISSUE at 12:01

## 2025-01-30 RX ADMIN — FENTANYL CITRATE 100 MCG: 50 INJECTION, SOLUTION INTRAMUSCULAR; INTRAVENOUS at 12:01

## 2025-01-30 RX ADMIN — BACLOFEN 10 MG: 10 TABLET ORAL at 10:01

## 2025-01-30 RX ADMIN — SODIUM CHLORIDE, POTASSIUM CHLORIDE, SODIUM LACTATE AND CALCIUM CHLORIDE 1000 ML: 600; 310; 30; 20 INJECTION, SOLUTION INTRAVENOUS at 01:01

## 2025-01-30 RX ADMIN — DEXMEDETOMIDINE HYDROCHLORIDE 20 MCG: 100 INJECTION, SOLUTION, CONCENTRATE INTRAVENOUS at 01:01

## 2025-01-30 RX ADMIN — OXYCODONE HYDROCHLORIDE 10 MG: 10 TABLET ORAL at 10:01

## 2025-01-30 RX ADMIN — OXYCODONE HYDROCHLORIDE 10 MG: 10 TABLET ORAL at 05:01

## 2025-01-30 RX ADMIN — VANCOMYCIN HYDROCHLORIDE 750 MG: 750 INJECTION, POWDER, LYOPHILIZED, FOR SOLUTION INTRAVENOUS at 12:01

## 2025-01-30 RX ADMIN — IOHEXOL 75 ML: 350 INJECTION, SOLUTION INTRAVENOUS at 12:01

## 2025-01-30 RX ADMIN — SUGAMMADEX 200 MG: 100 INJECTION, SOLUTION INTRAVENOUS at 01:01

## 2025-01-30 RX ADMIN — HYDROXYZINE HYDROCHLORIDE 50 MG: 25 TABLET ORAL at 10:01

## 2025-01-30 RX ADMIN — AMPICILLIN SODIUM AND SULBACTAM SODIUM 3 G: 2; 1 INJECTION, POWDER, FOR SOLUTION INTRAMUSCULAR; INTRAVENOUS at 05:01

## 2025-01-30 RX ADMIN — SODIUM CHLORIDE: 0.9 INJECTION, SOLUTION INTRAVENOUS at 12:01

## 2025-01-30 NOTE — ED TRIAGE NOTES
Pt reports abscess to the right side of the face for the past two weeks. She states she is now getting one on the left side of her face. Denies drainage, denies hx of abscess

## 2025-01-30 NOTE — OP NOTE
[facility] Intensive Care [facility]  Surgery Department  Operative Note    SUMMARY     Date of Procedure: 1/30/2025     Procedure: Procedure(s) (LRB):  INCISION AND DRAINAGE, ABSCESS (Right)     Surgeons and Role:     * Porfirio Wilson MD - Primary    Assisting Surgeon: None    Pre-Operative Diagnosis: Neck abscess [L02.11]    Post-Operative Diagnosis: Post-Op Diagnosis Codes:     * Neck abscess [L02.11]    Anesthesia: General    Operative Findings (including complications, if any):     Matted lymphadenopathy along anterior border of SCM  15 cc of non odorous yellow-green turbid fluid drained    Description of Technical Procedures:     Patient was identified in the preoperative area.  The procedure was reviewed in the patient signed consent forms for the surgery.  The patient was brought to the operating room and placed under general anesthesia without complication.  The neck was prepped and draped in standard fashion.  Extra precautions were used while handling sharps due to untreated HIV status.  A 4 cm incision was made 2 fingerbreadths below the mandible on the right side.  This was continued down through the platysma.  Immediately beneath the platysma a large rush of non odorous yellow-green turbid fluid was encountered.  Two culture swabs were inserted into this area.  The area was investigated bluntly for loculations.  The area was also irrigated thoroughly with 500 cc of 1 mg/mL sterile saline with vancomycin powder.  Neck is superior and inferior subplatysmal flaps were raised.  The anterior border of the SCM was identified.  Matted lymphadenopathy could be seen throughout the entire right level 2 and 3.  An incisional wedge biopsy was taken of 1 of these lymph nodes.  The specimen was divided and sent for permanent pathology and flow cytometry.  Hemostasis was achieved with cautery.  At this point the deep layer was loosely approximated with 3-0 Vicryl.  Roughly 30 cm of 1 in strip gauze was packed into  the cavity.  The skin was then closed with running 3-0 nylon.  At this point the procedure was deemed complete.  The patient was awakened and transported to PACU in stable condition.    Significant Surgical Tasks Conducted by the Assistant(s), if Applicable: None    Estimated Blood Loss (EBL): 30 cc           Implants: * No implants in log *    Specimens:   Specimen (24h ago, onward)       Start     Ordered    01/30/25 1459  Specimen to Pathology, Surgery ENT  Once        Comments: Pre-op Diagnosis: Neck abscess [L02.11]Procedure(s):INCISION AND DRAINAGE, ABSCESS Number of specimens: 1Name of specimens: 1. RIGHT LEVEL 1B INCISIONAL BIOPSY (perm)     References:    Click here for ordering Quick Tip   Question Answer Comment   Procedure Type: ENT    Specimen Class: Routine/Screening    Release to patient Immediate        01/30/25 1459    01/30/25 1434  Specimen to Pathology, Surgery ENT  Once,   Status:  Canceled        Comments: Pre-op Diagnosis: Neck abscess [L02.11]Procedure(s):INCISION AND DRAINAGE, ABSCESS Number of specimens: 2Name of specimens: 1. RIGHT LEVEL 1B INCISIONAL BIOPSY (fresh)2. RIGHT LEVEL 1B INCISIONAL BIOPSY (perm)     References:    Click here for ordering Quick Tip   Question Answer Comment   Procedure Type: ENT    Specimen Class: Routine/Screening    Release to patient Immediate        01/30/25 1434                            Condition: Good    Disposition: PACU - hemodynamically stable.    Attestation: Op Note Attestation: I performed the procedure.

## 2025-01-30 NOTE — NURSING
Arrival to ICU saying she doesn't want to be here wants to go home. Dizzy on walking. Still moving all around the bed violently. Refused all monitoring dressing small amount of serous drainage from neck rt side surgery site. Raman SPRING house supervisor orders states that dressing and package to be removed if she goes AMA.  After 15 minutes jumps out of bed stating she is going home. Dontrell Chiu calls security  to come up to ICU. Apple SPRING tried to get her to stay and receive medical care and called a family member and was going to [place] [facility]. Pt said she was taking the bus. Dr. Ramírez was called to Tell him pt Signed AMA papers. Dr. Guerrero is aware of pt wishes to leave. Pt escorted out with  security waking on her own power.refusing medical treatment after repeated attempts to stay.

## 2025-01-30 NOTE — H&P
[facility] Surg [address] Medicine  History & Physical    Patient Name: Bere White  MRN: [id number]  Patient Class: OP- Observation  Admission Date: 1/29/2025  Attending Physician: AZAM Cowart MD   Primary Care Provider: Jigna Primary Doctor         Patient information was obtained from patient, past medical records, and ER records.     Subjective:     Principal Problem:Submandibular abscess    Chief Complaint:   Chief Complaint   Patient presents with    Abscess     Pt presents with an abscess to right jaw/neck x2 weeks. Unknown origin per pt but states it has gotten bigger over the last 2 weeks.         HPI: Ms. Bere White is a 34 y.o. female, with PMH of HIV (not on HAART), active IVDU (injects into neck), prior endocarditis, prior peritonsillar abscess vs. Raul's angina, who [place] [facility] [place] due to recurrent facial swelling.  She states she has been experiencing facial swelling on the right side of her face for the past 2 weeks.  She states the pain has been increasing, and she believes it needs to be drained.   She additionally notes another new swelling on the left side of the jaw that is smaller but also present.  She denies difficulty breathing, oral swelling, foul taste in mouth, drainage of purulent material into the mouth, or out of the skin, fevers, numbness, weakness.  She was evaluated in the ED with labs that showed no leukocytosis or left shift.  H&H were 11.6/33.7.  A metabolic panel showed no MACRINA or significant electrolyte abnormalities.  She was hep C, and HIV positive.  Maxillofacial CT showed a large complex fluid collection with irregular nodular thick walled enhancement in the right side of the neck at the right submandibular area consistent with an abscess possibly of dental origin.  There was additionally bilateral (right greater than left) soft tissue swelling throughout the neck.  Multiple prominent lymph nodes were seen as well as a  "hypodense focus in the right palatine tonsil concerning for right palatine tonsil abscess.  She was treated in the ED with vancomycin and Zosyn.  She was placed on observation.        Past Medical History:   Diagnosis Date    Asthma     Hepatitis C     HIV (human immunodeficiency virus infection)        Past Surgical History:   Procedure Laterality Date     SECTION      FLEXOR TENDON REPAIR Left 2021    Procedure: REPAIR, TENDON, FLEXOR MULTIPLE;  Surgeon: Gokul Peterson Jr., MD;  Location: [facility];  Service: Orthopedics;  Laterality: Left;    knee scope right Right        Review of patient's allergies indicates:   Allergen Reactions    Ibuprofen Hives    Tylenol [acetaminophen]      "My liver doesn't process it"       No current facility-administered medications on file prior to encounter.     Current Outpatient Medications on File Prior to Encounter   Medication Sig    jqhnrkqhv-siuckjaf-anucukq ala (BIKTARVY) -25 mg (25 kg or greater) Take 1 tablet by mouth once daily.    buprenorphine HCL (SUBUTEX) 8 mg Subl Place 8 mg under the tongue once daily.    smabgdha-ellxrqbw-oaujtv-tenof, STRIBILD, [phone] mg (STRIBILD) [phone] mg per tablet Take 1 tablet by mouth once daily.    oxyCODONE (ROXICODONE) 5 MG immediate release tablet Take 1 tablet (5 mg total) by mouth every 4 (four) hours as needed for Pain.    oxyCODONE (ROXICODONE) 5 MG immediate release tablet Take 1 tablet (5 mg total) by mouth every 4 (four) hours as needed for Pain.    traMADoL (ULTRAM) 50 mg tablet Take 50 mg by mouth every 6 (six) hours as needed for Pain.    traZODone (DESYREL) 100 MG tablet Take 100 mg by mouth nightly as needed for Insomnia.     Family History    None       Tobacco Use    Smoking status: Every Day     Current packs/day: 1.00     Average packs/day: 1 pack/day for 17.1 years (17.1 ttl pk-yrs)     Types: Cigarettes     Start date: 2008    Smokeless tobacco: Not on file   Substance and " "Sexual Activity    Alcohol use: Not Currently    Drug use: Not Currently    Sexual activity: Yes     Partners: Male     Review of Systems   Unable to perform ROS: Other (The patient provides little history. She asks when she will be discharged as she is concerned about withdrawals, and matters of a personal nature regarding her living situation. She requesets food numerous times.)     Objective:     Vital Signs (Most Recent):  Temp: 97.4 °F (36.3 °C) (01/30/25 0329)  Pulse: 66 (01/30/25 0329)  Resp: 20 (01/30/25 0541)  BP: (!) 142/88 (01/30/25 0329)  SpO2: 99 % (01/30/25 0329) Vital Signs (24h Range):  Temp:  [97.4 °F (36.3 °C)-98.9 °F (37.2 °C)] 97.4 °F (36.3 °C)  Pulse:  [] 66  Resp:  [16-20] 20  SpO2:  [95 %-100 %] 99 %  BP: (104-146)/(66-89) 142/88     Weight: 59 kg (130 lb)  Body mass index is 23.78 kg/m².     Physical Exam  Vitals and nursing note reviewed.   Constitutional:       Appearance: She is diaphoretic.      Comments: The patient declines examination. States "I'm getting IVs and stuff put on me." Declines again after repeat request with explanation as to why examination is necessary.   Skin:     Comments: There is a large, red mass in the right side of the neck.    Neurological:      Mental Status: She is alert.   Psychiatric:         Attention and Perception: Attention and perception normal.         Mood and Affect: Affect is angry.         Behavior: Behavior is agitated and aggressive.                Significant Labs: All pertinent labs within the past 24 hours have been reviewed.  BMP:   Recent Labs   Lab 01/29/25 2106   GLU 97   *   K 3.5   CL 99   CO2 26   BUN 15   CREATININE 0.7   CALCIUM 9.4     CBC:   Recent Labs   Lab 01/29/25 2106   WBC 6.16   HGB 11.6*   HCT 33.7*        CMP:   Recent Labs   Lab 01/29/25 2106   *   K 3.5   CL 99   CO2 26   GLU 97   BUN 15   CREATININE 0.7   CALCIUM 9.4   PROT 9.4*   ALBUMIN 2.9*   BILITOT 0.4   ALKPHOS 146   AST 23   ALT 17 " "  ANIONGAP 9     Urine Culture: No results for input(s): "LABURIN" in the last 48 hours.  Urine Studies: No results for input(s): "COLORU", "APPEARANCEUA", "PHUR", "SPECGRAV", "PROTEINUA", "GLUCUA", "KETONESU", "BILIRUBINUA", "OCCULTUA", "NITRITE", "UROBILINOGEN", "LEUKOCYTESUR", "RBCUA", "WBCUA", "BACTERIA", "SQUAMEPITHEL", "HYALINECASTS" in the last 48 hours.    Invalid input(s): "WRIGHTSUR"    Significant Imaging: I have reviewed all pertinent imaging results/findings within the past 24 hours.  Imaging Results               CT Maxillofacial With Contrast (Final result)  Result time 01/30/25 02:15:36      Final result by Donald Hawkins MD (01/30/25 02:15:36)                   Impression:      Large complex fluid collection with irregular and nodular thick-walled enhancement seen within the right-side of the neck at the right submandibular location most consistent with abscess collection.  This is potentially of dental origin for which clinical and historical correlation is needed.    Prominent edema seen throughout the soft tissues of the neck, bilaterally, right greater than left as well as submental space.    Multiple prominent and heterogeneous lymph nodes, with lymph nodes demonstrating hypodense character that may relate to necrotic or suppurative lymph nodes bilaterally.    Hypodense focus at the right palatine tonsil may relate to a small right palatine tonsil abscess.    Prominent dental changes, correlation for acute dental disease is needed.    Paranasal sinus and mastoid findings as above.    This report was flagged in Epic as abnormal.      Electronically signed by: Donald Hawkins  Date:    01/30/2025  Time:    02:15               Narrative:    EXAMINATION:  CT MAXILLOFACIAL WITH CONTRAST    CLINICAL HISTORY:  Maxillary/facial abscess;    TECHNIQUE:  CT examination of the face and orbits was performed after the administration of 75 mL Omnipaque 350 intravenous contrast.  Axial imaging, sagittal " and coronal reconstruction imaging is submitted.    COMPARISON:  None    FINDINGS:  There is appearance of a complex fluid collection with irregular and somewhat nodular thick-walled enhancement seen within the soft tissues of the right submandibular region, appearing just anterior to the inferior aspect of the right submandibular gland, and just inferior to the level of the mandible on the right, this measures up to approximately 3.4 x 4.2 cm on axial imaging and approximately 3.6 cm in the craniocaudal dimension on coronal reconstruction imaging.  The appearance is most consistent with an abscess.    There is associated prominent edema throughout the soft tissues of the lower face and neck, including edema extending along the right submandibular gland, and along the right parotid gland, there is prominent edema seen within the submental soft tissues.  There are multiple prominent lymph nodes throughout the visualized upper neck bilaterally, multiple enhancing and heterogeneous lymph nodes, several of which demonstrate areas of heterogeneous central hypodense character that may relate to necrotic or suppurative lymph nodes bilaterally.  This includes multiple lymph nodes extending along the inferior aspect of the field of view, deep to the right sternocleidomastoid muscle.  Prominent edema seen throughout the soft tissues.    There is appearance that may relate to mild edema along the retropharyngeal space without a well-defined fluid collection or abscess.  The visualized airway appears patent.  The epiglottis does not appear abnormally thickened.    In addition there is appearance of a hypodense finding associated with the right palatine tonsil, as seen on axial image 114 measuring approximately 8.1 x 7.2 mm in size that could represent a tonsillar abscess.    There is mild edema seen within the parapharyngeal space bilaterally without a large degree of edematous change or cystic collection of the parapharyngeal  space.    There are prominent dental changes noted, multiple findings consistent with dental caries and areas of periapical lucency, this includes periapical lucency involving what appears to be the right mandibular 2nd bicuspid.  Therefore the potential that the large right-sided submandibular abscess is of dental origin is to be considered.    The globes, extraocular muscles and optic nerves of the orbits appear appropriate, there is no evidence for retrobulbar mass or cystic collection or abscess collection.  There is prominent opacification of the frontal sinus, there is moderate to prominent opacification of the ethmoid air cells particularly the mid to anterior ethmoid air cells.  There is mild mucosal thickening of the sphenoid sinus.  There is variable moderate to prominent mucosal thickening of the maxillary antra bilaterally as well as what appears to represent viscous fluid.  There is partial opacification of the visualized mastoid air cells bilaterally.    There is appearance thought to represent left vertebral dominance with termination of the right vertebral artery at the level of the right posterior inferior cerebellar artery.    The visualized osseous structures appear intact.                                       Assessment/Plan:     * Submandibular abscess, right-sided  - Ms. Bere White presents with bilateral facial swelling right greater than left  - imaging is consistent with right submandibular abscess, of suspected dental origin   - the patient does endorse IV drug use, with injection sites in neck  - status post vancomycin, Zosyn in ED  - continue treatment with Unasyn per up-to-date recommendations  - consider consultation to [facility]      Schizophrenia  - continue home meds      IVDU (intravenous drug user)  - reports she is an active IV drug user with injection site into neck  - urine tox screen pending    HIV (human immunodeficiency virus infection)  - not currently on HAART  - formerly  followed with [facility]  - CD4, viral load ordered        VTE Risk Mitigation (From admission, onward)           Ordered     IP VTE LOW RISK PATIENT  Once         01/30/25 0428     Place sequential compression device  Until discontinued         01/30/25 0428                         On 01/30/2025, patient should be placed in hospital observation services under the care of Dr. AZAM Cowart MD.           Alida Peterson PA-C  Department of Hospital Medicine  [facility] Surg ([address])

## 2025-01-30 NOTE — ASSESSMENT & PLAN NOTE
- Ms. Bere White presents with bilateral facial swelling right greater than left  - imaging is consistent with right submandibular abscess, of suspected dental origin   - the patient does endorse IV drug use, with injection sites in neck  - status post vancomycin, Zosyn in ED  - continue treatment with Unasyn per up-to-date recommendations  - consider consultation to [facility]

## 2025-01-30 NOTE — ANESTHESIA PROCEDURE NOTES
Intubation    Date/Time: 1/30/2025 12:41 PM    Performed by: Hui Barrow CRNA  Authorized by: Russell Hernandez MD    Intubation:     Induction:  Intravenous    Intubated:  Postinduction    Mask Ventilation:  Easy mask    Attempts:  1    Attempted By:  Student    Method of Intubation:  Video laryngoscopy    Blade:  Carlton 3    Laryngeal View Grade: Grade I - full view of cords      Difficult Airway Encountered?: No      Complications:  None    Airway Device:  Oral endotracheal tube    Airway Device Size:  7.5    Style/Cuff Inflation:  Cuffed (inflated to minimal occlusive pressure)    Tube secured:  22    Secured at:  The lips    Placement Verified By:  Capnometry    Complicating Factors:  None    Findings Post-Intubation:  BS equal bilateral and atraumatic/condition of teeth unchanged

## 2025-01-30 NOTE — HPI
Ms. Bere White is a 34 y.o. female, with PMH of HIV (not on HAART), active IVDU (injects into neck), prior endocarditis, prior peritonsillar abscess vs. Raul's angina, who presented to [facility] ED on 01/30/2025 due to recurrent facial swelling.  She states she has been experiencing facial swelling on the right side of her face for the past 2 weeks.  She states the pain has been increasing, and she believes it needs to be drained.   She additionally notes another new swelling on the left side of the jaw that is smaller but also present.  She denies difficulty breathing, oral swelling, foul taste in mouth, drainage of purulent material into the mouth, or out of the skin, fevers, numbness, weakness.  She was evaluated in the ED with labs that showed no leukocytosis or left shift.  H&H were 11.6/33.7.  A metabolic panel showed no MACRINA or significant electrolyte abnormalities.  She was hep C, and HIV positive.  Maxillofacial CT showed a large complex fluid collection with irregular nodular thick walled enhancement in the right side of the neck at the right submandibular area consistent with an abscess possibly of dental origin.  There was additionally bilateral (right greater than left) soft tissue swelling throughout the neck.  Multiple prominent lymph nodes were seen as well as a hypodense focus in the right palatine tonsil concerning for right palatine tonsil abscess.  She was treated in the ED with vancomycin and Zosyn.  She was placed on observation.

## 2025-01-30 NOTE — BRIEF OP NOTE
[facility] [place] - Med Surg ([address])  Brief Operative Note    Surgery Date: 1/30/2025     Surgeons and Role:     * Porfirio Wilson MD - Primary    Assisting Surgeon: None    Pre-op Diagnosis:  Neck abscess [L02.11]    Post-op Diagnosis:  Post-Op Diagnosis Codes:     * Neck abscess [L02.11]    Incisional Biopsy of Cervical Lymph Node    Anesthesia: General    Operative Findings: See full op note    Matted lymphadenopathy with turbid yellow non odorous fluid    Estimated Blood Loss: 20 cc         Specimens:   Specimen (24h ago, onward)      None

## 2025-01-30 NOTE — CONSULTS
"  Ear, Nose, & Throat  Otolaryngology - Head & Neck Surgery      Assessment:     34-year-old female with a history of untreated HIV, hepatitis-C, and IVDU (injects in right neck) who presents today with a fluid collection in her right neck consistent with an abscess.    [place] of [place] [facility] [place] showed similar matted lymphadenopathy with central necrosis separate from area of concern.  Raises some concern for neoplastic process versus suppurative lymphadenitis.  No leukocytosis or fevers. Exam overall unremarkable, though this could all be secondary to suppressed immune function. Will take to OR for drainage, cultures and biopsy. Expect local wound care will need to be performed in-house 2-3 days following procedure. Please call or chat me with any concerns    Plan:     I had a long discussion with the patient regarding her condition and the further workup and management options.  We will proceed to the OR for drainage and incisional biopsy.    Subjective:     Chief Complaint: Neck Abscess    Bere White is a 34 y.o. female whom ENT was consulted for right submandibular abscess. She injects in the right neck. She has not had previous I&D. Denies losing any needles in the neck. No difficulty breathing or swallowing medications. Of note, patient had a CT neck 11/28/2024 which had similar smaller appearance. No WBC today. Afebrile.     Per Hospital H&P:  "Ms. Bere White is a 34 y.o. female, with PMH of HIV (not on HAART), active IVDU (injects into neck), prior endocarditis, prior peritonsillar abscess vs. Raul's angina, who [place] [facility] [place] on 01/30/2025 due to recurrent facial swelling. She states she has been experiencing facial swelling on the right side of her face for the past 2 weeks. She states the pain has been increasing, and she believes it needs to be drained.  She additionally notes another new swelling on the left side of the jaw that is smaller but also " "present. She denies difficulty breathing, oral swelling, foul taste in mouth, drainage of purulent material into the mouth, or out of the skin, fevers, numbness, weakness. She was evaluated in the ED with labs that showed no leukocytosis or left shift. H&H were 11.6/33.7. A metabolic panel showed no MACRINA or significant electrolyte abnormalities. She was hep C, and HIV positive. Maxillofacial CT showed a large complex fluid collection with irregular nodular thick walled enhancement in the right side of the neck at the right submandibular area consistent with an abscess possibly of dental origin. There was additionally bilateral (right greater than left) soft tissue swelling throughout the neck. Multiple prominent lymph nodes were seen as well as a hypodense focus in the right palatine tonsil concerning for right palatine tonsil abscess. She was treated in the ED with vancomycin and Zosyn. She was placed on observation. "    Past Medical History  Active Ambulatory Problems     Diagnosis Date Noted    Flexor tendon laceration of left forearm with open wound 2021    Depressive disorder 2025    H/O endocarditis 10/26/2021    Hepatitis C virus infection, unspecified chronicity 2021    History of drug abuse 2025    Anxiety 2025     Resolved Ambulatory Problems     Diagnosis Date Noted    No Resolved Ambulatory Problems     Past Medical History:   Diagnosis Date    Asthma     Hepatitis C     HIV (human immunodeficiency virus infection)        Past Surgical History  She has a past surgical history that includes knee scope right (Right);  section; and Flexor tendon repair (Left, 2021).    Past Surgical History:   Procedure Laterality Date     SECTION      FLEXOR TENDON REPAIR Left 2021    Procedure: REPAIR, TENDON, FLEXOR MULTIPLE;  Surgeon: Gokul Peterson Jr., MD;  Location: [facility] OR;  Service: Orthopedics;  Laterality: Left;    knee scope right Right     " "    Allergies  She is allergic to ibuprofen and tylenol [acetaminophen].      Objective:     BP (!) 149/78 (BP Location: Right arm, Patient Position: Lying)   Pulse 60   Temp 98 °F (36.7 °C) (Oral)   Resp 12   Ht 5' 2" (1.575 m)   Wt 59 kg (130 lb 1.1 oz)   LMP  (LMP Unknown)   SpO2 96%   Breastfeeding No   BMI 23.79 kg/m²    Constitutional: Well appearing, communicating. No acute distress  Voice: Euphonic  Head/Face:   House Brackmann I Bilaterally  Parotid glands not enlarged  Oral Cavity   Dentition: poor   Partially edentulous, Tooth #29 fractured and carious, not TTP   FOM soft palpation   No trismus   No tongue atrophy, symmetric protrusion  Oropharynx:   Tonsils: Symmetric, 3+, erythematous and with purulent exudate   [place] Tongue Position: 3 (partial obstruction soft palate)   No pharyngeal erythema   Symmetric Palate elevation   BOT palpation deferred  Neck   3 cm fluctuant mass of the right submandibular space, multiple enlarged lymph nodes bilaterally    Not particularly indurated or erythematous   Trachea midline   Laryngeal landmarks palpable  Neuro/Psychiatric:     Affect: Appropriate  Respiratory:   No increased WOB  No stridor       Data Review:   LABS  Hemoglobin (g/dL)   Date Value   01/29/2025 11.6 (L)     WBC (K/uL)   Date Value   01/29/2025 6.16     Platelets (K/uL)   Date Value   01/29/2025 247     Creatinine (mg/dL)   Date Value   01/29/2025 0.7     Calcium (mg/dL)   Date Value   01/29/2025 9.4     INR (no units)   Date Value   09/16/2020 1.1    (HGB:1,HCT:1,WBC:1,PLT:1,NA:1,K:1,CREATININE:1,BUN:1,GLU:1,POCTGLUCOSE:1,CALCIUM:1, A1C:1)@    I have independently reviewed the lab results shown above. Findings significant for the conditions being treated include:  No leukocytosis, mild anemia    IMAGING    I have personally reviewed all pertinent for the patient which includes CT Max/Face with Contrast dated 1/30/2025. My personal findings include large 3 cm fluid collection of the " right submandibular space with surrounding stranding of the soft tissues, bilateral matted lymphadenopathy with a separate areas of central necrosis, compression of the right jugular from the skull base to level 4, posterior displacement of the right ICA and ECA.  Paranasal sinus disease .  Multiple large periapical abscesses    MICRO    None    PATH    None    Procedures:     None Performed     Voice recognition software was used in the creation of this note/communication and any sound-alike errors which may have occurred from its use should be taken in context when interpreting. If such errors prevent a clear understanding of the note/communication, please contact the office for clarification.

## 2025-01-30 NOTE — TRANSFER OF CARE
"Anesthesia Transfer of Care Note    Patient: Bere White    Procedure(s) Performed: Procedure(s) (LRB):  INCISION AND DRAINAGE, ABSCESS (Right)    Patient location: PACU    Anesthesia Type: general    Transport from OR: Transported from OR on 6-10 L/min O2 by face mask with adequate spontaneous ventilation    Post pain: adequate analgesia    Post assessment: no apparent anesthetic complications and tolerated procedure well    Post vital signs: stable    Level of consciousness: awake, alert, confused and agitated    Nausea/Vomiting: no nausea/vomiting    Complications: none    Transfer of care protocol was followed      Last vitals: Visit Vitals  BP (!) 149/78 (BP Location: Right arm, Patient Position: Lying)   Pulse 60   Temp 36.7 °C (98 °F) (Oral)   Resp 16   Ht 5' 2" (1.575 m)   Wt 59 kg (130 lb 1.1 oz)   LMP  (LMP Unknown)   SpO2 96%   Breastfeeding No   BMI 23.79 kg/m²     "

## 2025-01-30 NOTE — ED NOTES
"Pt refusing to receive the rest of her IV vancomycin at this time. Pt states "vanc does not work on me my body is immune to it". MD made aware.  "

## 2025-01-30 NOTE — FIRST PROVIDER EVALUATION
" Emergency Department TeleTriage Encounter Note      CHIEF COMPLAINT    Chief Complaint   Patient presents with    Abscess     Pt presents with an abscess to right jaw/neck x2 weeks. Unknown origin per pt but states it has gotten bigger over the last 2 weeks.        VITAL SIGNS   Initial Vitals [01/29/25 2015]   BP Pulse Resp Temp SpO2   135/85 100 16 98.7 °F (37.1 °C) 96 %      MAP       --            ALLERGIES    Review of patient's allergies indicates:   Allergen Reactions    Ibuprofen Hives    Tylenol [acetaminophen]      "My liver doesn't process it"       PROVIDER TRIAGE NOTE  TeleTriage Note: Bere White, a nontoxic/well appearing, 34 y.o. female, presented to the ED with c/o abscess to right jaw line that has spread to left side for the past 2 weeks. Denies fevers.     All ED beds are full at present; patient notified of this status.  Patient seen and medically screened by Nurse Practitioner via teletriage. Orders initiated at triage to expedite care.  Patient is stable to return to the waiting room and will be placed in an ED bed when available.  Care will be transferred to an alternate provider when patient has been placed in an Exam Room from the [facility] for physical exam, additional orders, and disposition.  8:55 PM Rose Walden, DNP, FNP-C        ORDERS  Labs Reviewed   HEPATITIS C ANTIBODY   HIV 1 / 2 ANTIBODY   CBC W/ AUTO DIFFERENTIAL   COMPREHENSIVE METABOLIC PANEL   C-REACTIVE PROTEIN   POCT URINE PREGNANCY       ED Orders (720h ago, onward)      Start Ordered     Status Ordering Provider    01/29/25 2057 01/29/25 2056  C-reactive protein  STAT         Ordered ROSE WALDEN    01/29/25 2057 01/29/25 2056  POCT urine pregnancy  Once         Ordered ROSE WALDEN    01/29/25 2057 01/29/25 2056  CT Maxillofacial With Contrast  1 time imaging         Ordered ROSE WALDEN    01/29/25 2056 01/29/25 2056  Insert peripheral IV  Continuous         Ordered ROSE WALDEN    01/29/25 " 2056 01/29/25 2056  CBC auto differential  STAT         Ordered LIZANDRO LIZAMAChina    01/29/25 2056 01/29/25 2056  Comprehensive metabolic panel  STAT         Ordered LIZANDRO LIZAMAChina    01/29/25 2037 01/29/25 2036  Hepatitis C Antibody  STAT         Ordered REBEKAH ELIZABETHChina    01/29/25 2037 01/29/25 2036  HIV 1/2 Ag/Ab (4th Gen)  STAT         Ordered REBEKAH ELIZABETH              Virtual Visit Note: The provider triage portion of this emergency department evaluation and documentation was performed via [employer], a HIPAA-compliant telemedicine application, in concert with a tele-presenter in the room. A face to face patient evaluation with one of my colleagues will occur once the patient is placed in an emergency department room.      DISCLAIMER: This note was prepared with [employer] voice recognition transcription software. Garbled syntax, mangled pronouns, and other bizarre constructions may be attributed to that software system.

## 2025-01-30 NOTE — ED NOTES
Multiple unsuccessful RN attempts with ultrasound machine to get IV access. House supervisor notified and will attempt.

## 2025-01-30 NOTE — ED PROVIDER NOTES
"Encounter Date: 2025       History     Chief Complaint   Patient presents with    Abscess     Pt presents with an abscess to right jaw/neck x2 weeks. Unknown origin per pt but states it has gotten bigger over the last 2 weeks.      34-year-old female with past medical history of HIV not currently on HAART, active IVDU (injects into neck), prior history of endocarditis, prior history of facial swelling consistent with peritonsillar abscess versus Raul's angina now presents with chief complaint of recurrence of facial swelling.  Patient tells me that for the past 2 weeks she has experience worsening swelling of the right side of her face which has becoming increasingly painful and she believes that she needs it "drained".  Additionally patient tells me that she has experienced a new another swelling on the left side of her jaw however it is much smaller.  Patient denies any difficulty breathing or oral swelling/oral drainage/foul taste in her mouth.  Additionally patient denies any fevers, new numbness, or weakness..    The history is provided by the patient.     Review of patient's allergies indicates:   Allergen Reactions    Ibuprofen Hives    Tylenol [acetaminophen]      "My liver doesn't process it"     Past Medical History:   Diagnosis Date    Asthma     Hepatitis C     HIV (human immunodeficiency virus infection)      Past Surgical History:   Procedure Laterality Date     SECTION      FLEXOR TENDON REPAIR Left 2021    Procedure: REPAIR, TENDON, FLEXOR MULTIPLE;  Surgeon: Gokul Peterson Jr., MD;  Location: [facility];  Service: Orthopedics;  Laterality: Left;    knee scope right Right      No family history on file.  Social History     Tobacco Use    Smoking status: Every Day     Current packs/day: 1.00     Average packs/day: 1 pack/day for 17.1 years (17.1 ttl pk-yrs)     Types: Cigarettes     Start date: 2008   Substance Use Topics    Alcohol use: Not Currently    Drug use: Not Currently "     Review of Systems  See HPI     Physical Exam     Initial Vitals [01/29/25 2015]   BP Pulse Resp Temp SpO2   135/85 100 16 98.7 °F (37.1 °C) 96 %      MAP       --         Physical Exam    Nursing note and vitals reviewed.      Gen: AxOx3, unkempt and chronically ill-appearing, no pallor, no jaundice, appears mildly dehydrated with dry mucous membranes  Eye: EOMI, no scleral icterus, no periorbital edema or ecchymosis  Head:  Swelling over right mandibular angle, atraumatic, no lesions, scalp appears normal  ENT: Neck supple, no stridor, no drooling or voice changes  - large 10 cm swelling at over right mandibular angle without associated submental or sublingual swelling  - small 1 cm swelling over left mandibular angle without associated submental or sublingual swelling  - Oropharynx appears normal without lesions, erythema, tonsillar swelling, or exudate  - multiple dental caries  CVS: All distal pulses intact with normal rate and rhythm, no JVD, normal S1/S2, no murmur  Pulm: Normal breath sounds, no wheezes, rales or rhonchi, no increased work of breathing  Abd:  Nondistended, soft, nontender, no organomegaly, no CVAT  Ext: No edema, no lesions, rashes, or deformity  Neuro: GCS15, moving all extremities, gait intact, face grossly symmetric  Psych: normal affect, cooperative, well groomed, makes good eye contact      ED Course   Procedures  Labs Reviewed   HEPATITIS C ANTIBODY - Abnormal       Result Value    Hepatitis C Ab Positive (*)     Narrative:     Release to patient->Immediate   HIV 1 / 2 ANTIBODY - Abnormal    HIV 1/2 Ag/Ab Positive (*)     Narrative:     Release to patient->Immediate   CBC W/ AUTO DIFFERENTIAL - Abnormal    WBC 6.16      RBC 4.19      Hemoglobin 11.6 (*)     Hematocrit 33.7 (*)     MCV 80 (*)     MCH 27.7      MCHC 34.4      RDW 13.2      Platelets 247      MPV 8.2 (*)     Immature Granulocytes 0.6 (*)     Gran # (ANC) 5.0      Immature Grans (Abs) 0.04      Lymph # 0.8 (*)     Mono  # 0.3      Eos # 0.0      Baso # 0.02      nRBC 0      Gran % 81.0 (*)     Lymph % 13.1 (*)     Mono % 4.7      Eosinophil % 0.3      Basophil % 0.3      Differential Method Automated      Narrative:     Release to patient->Immediate   COMPREHENSIVE METABOLIC PANEL - Abnormal    Sodium 134 (*)     Potassium 3.5      Chloride 99      CO2 26      Glucose 97      BUN 15      Creatinine 0.7      Calcium 9.4      Total Protein 9.4 (*)     Albumin 2.9 (*)     Total Bilirubin 0.4      Alkaline Phosphatase 146      AST 23      ALT 17      eGFR >60      Anion Gap 9      Narrative:     Release to patient->Immediate   C-REACTIVE PROTEIN - Abnormal    CRP 78.7 (*)     Narrative:     Release to patient->Immediate   CULTURE, BLOOD   CULTURE, BLOOD   HEPATITIS C RNA, QUANTITATIVE, PCR   HCG, QUANTITATIVE   HIV 1/2 SUPPLEMENTAL ASSAY   HEPATITIS C RNA, QUANTITATIVE, PCR   HCG, QUANTITATIVE   QUANTIFERON GOLD TB   POCT URINE PREGNANCY          Imaging Results              CT Maxillofacial With Contrast (In process)                      Medications   iohexoL (OMNIPAQUE 350) injection 75 mL (has no administration in time range)   vancomycin 1,250 mg in 0.9% NaCl 250 mL IVPB (admixture device) (has no administration in time range)   piperacillin-tazobactam (ZOSYN) 4.5 g in D5W 100 mL IVPB (MB+) (has no administration in time range)   dexAMETHasone injection 12 mg (has no administration in time range)   HYDROmorphone injection 1 mg (has no administration in time range)   lactated ringers bolus 1,000 mL (has no administration in time range)     Medical Decision Making  Initial assessment  34-year-old female with past medical history of HIV not currently on HAART, active IVDU (injects into neck), prior history of endocarditis, prior history of facial swelling consistent with peritonsillar abscess versus Raul's angina now presents with chief complaint of recurrence of facial swelling. Patient is able to vocalise, breathing spontaneously,  hemodynamically stable, oriented, moving all 4 limbs spontaneously.  Examination consistent with large swelling over submental areas (right greater than left).      Differential diagnosis  Parotid abscess  Lymphadenitis  Scrofuloderma  Peritonsillar abscess  Raul's angina but lower suspicion  Sialoadenitis        ED management  Patient was unkempt and chronically ill-appearing.  Given that patient is immunocompromise with HIV and not on ARVs I have high suspicion deep-seated abscess and patient was given broad-spectrum antibiotics and dexamethasone for associated swelling.  I did not suspect that patient has Raul's as there was no submental swelling and protecting her airway at present.    CBC consistent with mild anemia.  CMP grossly within normal limits.  Blood cultures ordered and pending.  I ordered CT max face.  I anticipate patient requiring admission.      Amount and/or Complexity of Data Reviewed  Labs: ordered. Decision-making details documented in ED Course.    Risk  Prescription drug management.               ED Course as of 01/30/25 0003 Wed Jan 29, 2025 2229 BP: 135/85 [PM]   2230 Temp: 98.7 °F (37.1 °C) [PM]   2230 Pulse: 100 [PM]   2230 Resp: 16 [PM]   2230 SpO2: 96 % [PM]   2250 WBC: 6.16 [PM]   2250 Hemoglobin(!): 11.6 [PM]   2250 Platelet Count: 247 [PM]   2250 Sodium(!): 134 [PM]   2250 Potassium: 3.5 [PM]   2250 BUN: 15 [PM]   2250 Creatinine: 0.7 [PM]   2250 Anion Gap: 9 [PM]   2250 Hepatitis C Ab(!): Positive [PM]   2250 HIV 1/2 Ag/Ab(!): Positive  Known [PM]      ED Course User Index  [PM] Amarilys Dobbs MD                           Clinical Impression:  Final diagnoses:  [L02.01] Facial abscess (Primary)                 Amarilys Dobbs MD  Resident  01/30/25 0003

## 2025-01-30 NOTE — PROGRESS NOTES
"Pharmacokinetic Initial Assessment: IV Vancomycin    Assessment/Plan:    Initiate intravenous vancomycin with loading dose of 1250 mg once followed by a maintenance dose of vancomycin 750 mg IV every 12 hours  Desired empiric serum trough concentration is 10 to 20 mcg/mL  Draw vancomycin trough level 60 min prior to fourth dose on 1/31/2025 at approximately 12:00  Pharmacy will continue to follow and monitor vancomycin.      Please contact pharmacy at extension [phone] with any questions regarding this assessment.     Thank you for the consult,   Cassidy Vega       Patient brief summary:  Bere White is a 34 y.o. female initiated on antimicrobial therapy with IV Vancomycin for treatment of suspected skin & soft tissue infection    Drug Allergies:   Review of patient's allergies indicates:   Allergen Reactions    Ibuprofen Hives    Tylenol [acetaminophen]      "My liver doesn't process it"       Actual Body Weight:   59 kg    Renal Function:   Estimated Creatinine Clearance: 87.9 mL/min (based on SCr of 0.7 mg/dL).,     Dialysis Method (if applicable):  N/A    CBC (last 72 hours):  Recent Labs   Lab Result Units 01/29/25  2106   WBC K/uL 6.16   Hemoglobin g/dL 11.6*   Hematocrit % 33.7*   Platelets K/uL 247   Gran % % 81.0*   Lymph % % 13.1*   Mono % % 4.7   Eosinophil % % 0.3   Basophil % % 0.3   Differential Method  Automated       Metabolic Panel (last 72 hours):  Recent Labs   Lab Result Units 01/29/25  2106   Sodium mmol/L 134*   Potassium mmol/L 3.5   Chloride mmol/L 99   CO2 mmol/L 26   Glucose mg/dL 97   BUN mg/dL 15   Creatinine mg/dL 0.7   Albumin g/dL 2.9*   Total Bilirubin mg/dL 0.4   Alkaline Phosphatase U/L 146   AST U/L 23   ALT U/L 17     Microbiologic Results:  Microbiology Results (last 7 days)       Procedure Component Value Units Date/Time    Blood Culture #2 **CANNOT BE ORDERED STAT** [[id number]] Collected: 01/29/25 1849    Order Status: Sent Specimen: Blood from Peripheral, Forearm, Right  "    Blood Culture #1 **CANNOT BE ORDERED STAT** [[id number]] Collected: 01/29/25 3463    Order Status: Sent Specimen: Blood from Peripheral, Antecubital, Right

## 2025-01-30 NOTE — SUBJECTIVE & OBJECTIVE
"Past Medical History:   Diagnosis Date    Asthma     Hepatitis C     HIV (human immunodeficiency virus infection)        Past Surgical History:   Procedure Laterality Date     SECTION      FLEXOR TENDON REPAIR Left 2021    Procedure: REPAIR, TENDON, FLEXOR MULTIPLE;  Surgeon: Gokul Peterson Jr., MD;  Location: [facility];  Service: Orthopedics;  Laterality: Left;    knee scope right Right        Review of patient's allergies indicates:   Allergen Reactions    Ibuprofen Hives    Tylenol [acetaminophen]      "My liver doesn't process it"       No current facility-administered medications on file prior to encounter.     Current Outpatient Medications on File Prior to Encounter   Medication Sig    gttpjayup-azinmsmb-zztqkpy ala (BIKTARVY) -25 mg (25 kg or greater) Take 1 tablet by mouth once daily.    buprenorphine HCL (SUBUTEX) 8 mg Subl Place 8 mg under the tongue once daily.    nfegclcl-iikqxgdo-otgzey-tenof, STRIBILD, [phone] mg (STRIBILD) [phone] mg per tablet Take 1 tablet by mouth once daily.    oxyCODONE (ROXICODONE) 5 MG immediate release tablet Take 1 tablet (5 mg total) by mouth every 4 (four) hours as needed for Pain.    oxyCODONE (ROXICODONE) 5 MG immediate release tablet Take 1 tablet (5 mg total) by mouth every 4 (four) hours as needed for Pain.    traMADoL (ULTRAM) 50 mg tablet Take 50 mg by mouth every 6 (six) hours as needed for Pain.    traZODone (DESYREL) 100 MG tablet Take 100 mg by mouth nightly as needed for Insomnia.     Family History    None       Tobacco Use    Smoking status: Every Day     Current packs/day: 1.00     Average packs/day: 1 pack/day for 17.1 years (17.1 ttl pk-yrs)     Types: Cigarettes     Start date: 2008    Smokeless tobacco: Not on file   Substance and Sexual Activity    Alcohol use: Not Currently    Drug use: Not Currently    Sexual activity: Yes     Partners: Male     Review of Systems   Unable to perform ROS: Other (The patient " "provides little history. She asks when she will be discharged as she is concerned about withdrawals, and matters of a personal nature regarding her living situation. She requesets food numerous times.)     Objective:     Vital Signs (Most Recent):  Temp: 97.4 °F (36.3 °C) (01/30/25 0329)  Pulse: 66 (01/30/25 0329)  Resp: 20 (01/30/25 0541)  BP: (!) 142/88 (01/30/25 0329)  SpO2: 99 % (01/30/25 0329) Vital Signs (24h Range):  Temp:  [97.4 °F (36.3 °C)-98.9 °F (37.2 °C)] 97.4 °F (36.3 °C)  Pulse:  [] 66  Resp:  [16-20] 20  SpO2:  [95 %-100 %] 99 %  BP: (104-146)/(66-89) 142/88     Weight: 59 kg (130 lb)  Body mass index is 23.78 kg/m².     Physical Exam  Vitals and nursing note reviewed.   Constitutional:       Appearance: She is diaphoretic.      Comments: The patient declines examination. States "I'm getting IVs and stuff put on me." Declines again after repeat request with explanation as to why examination is necessary.   Skin:     Comments: There is a large, red mass in the right side of the neck.    Neurological:      Mental Status: She is alert.   Psychiatric:         Attention and Perception: Attention and perception normal.         Mood and Affect: Affect is angry.         Behavior: Behavior is agitated and aggressive.                Significant Labs: All pertinent labs within the past 24 hours have been reviewed.  BMP:   Recent Labs   Lab 01/29/25 2106   GLU 97   *   K 3.5   CL 99   CO2 26   BUN 15   CREATININE 0.7   CALCIUM 9.4     CBC:   Recent Labs   Lab 01/29/25 2106   WBC 6.16   HGB 11.6*   HCT 33.7*        CMP:   Recent Labs   Lab 01/29/25 2106   *   K 3.5   CL 99   CO2 26   GLU 97   BUN 15   CREATININE 0.7   CALCIUM 9.4   PROT 9.4*   ALBUMIN 2.9*   BILITOT 0.4   ALKPHOS 146   AST 23   ALT 17   ANIONGAP 9     Urine Culture: No results for input(s): "LABURIN" in the last 48 hours.  Urine Studies: No results for input(s): "COLORU", "APPEARANCEUA", "PHUR", "SPECGRAV", " ""PROTEINUA", "GLUCUA", "KETONESU", "BILIRUBINUA", "OCCULTUA", "NITRITE", "UROBILINOGEN", "LEUKOCYTESUR", "RBCUA", "WBCUA", "BACTERIA", "SQUAMEPITHEL", "HYALINECASTS" in the last 48 hours.    Invalid input(s): "WRIGHTSUR"    Significant Imaging: I have reviewed all pertinent imaging results/findings within the past 24 hours.  Imaging Results               CT Maxillofacial With Contrast (Final result)  Result time 01/30/25 02:15:36      Final result by Donald Hawkins MD (01/30/25 02:15:36)                   Impression:      Large complex fluid collection with irregular and nodular thick-walled enhancement seen within the right-side of the neck at the right submandibular location most consistent with abscess collection.  This is potentially of dental origin for which clinical and historical correlation is needed.    Prominent edema seen throughout the soft tissues of the neck, bilaterally, right greater than left as well as submental space.    Multiple prominent and heterogeneous lymph nodes, with lymph nodes demonstrating hypodense character that may relate to necrotic or suppurative lymph nodes bilaterally.    Hypodense focus at the right palatine tonsil may relate to a small right palatine tonsil abscess.    Prominent dental changes, correlation for acute dental disease is needed.    Paranasal sinus and mastoid findings as above.    This report was flagged in Epic as abnormal.      Electronically signed by: Donald Hawkins  Date:    01/30/2025  Time:    02:15               Narrative:    EXAMINATION:  CT MAXILLOFACIAL WITH CONTRAST    CLINICAL HISTORY:  Maxillary/facial abscess;    TECHNIQUE:  CT examination of the face and orbits was performed after the administration of 75 mL Omnipaque 350 intravenous contrast.  Axial imaging, sagittal and coronal reconstruction imaging is submitted.    COMPARISON:  None    FINDINGS:  There is appearance of a complex fluid collection with irregular and somewhat nodular " thick-walled enhancement seen within the soft tissues of the right submandibular region, appearing just anterior to the inferior aspect of the right submandibular gland, and just inferior to the level of the mandible on the right, this measures up to approximately 3.4 x 4.2 cm on axial imaging and approximately 3.6 cm in the craniocaudal dimension on coronal reconstruction imaging.  The appearance is most consistent with an abscess.    There is associated prominent edema throughout the soft tissues of the lower face and neck, including edema extending along the right submandibular gland, and along the right parotid gland, there is prominent edema seen within the submental soft tissues.  There are multiple prominent lymph nodes throughout the visualized upper neck bilaterally, multiple enhancing and heterogeneous lymph nodes, several of which demonstrate areas of heterogeneous central hypodense character that may relate to necrotic or suppurative lymph nodes bilaterally.  This includes multiple lymph nodes extending along the inferior aspect of the field of view, deep to the right sternocleidomastoid muscle.  Prominent edema seen throughout the soft tissues.    There is appearance that may relate to mild edema along the retropharyngeal space without a well-defined fluid collection or abscess.  The visualized airway appears patent.  The epiglottis does not appear abnormally thickened.    In addition there is appearance of a hypodense finding associated with the right palatine tonsil, as seen on axial image 114 measuring approximately 8.1 x 7.2 mm in size that could represent a tonsillar abscess.    There is mild edema seen within the parapharyngeal space bilaterally without a large degree of edematous change or cystic collection of the parapharyngeal space.    There are prominent dental changes noted, multiple findings consistent with dental caries and areas of periapical lucency, this includes periapical lucency  involving what appears to be the right mandibular 2nd bicuspid.  Therefore the potential that the large right-sided submandibular abscess is of dental origin is to be considered.    The globes, extraocular muscles and optic nerves of the orbits appear appropriate, there is no evidence for retrobulbar mass or cystic collection or abscess collection.  There is prominent opacification of the frontal sinus, there is moderate to prominent opacification of the ethmoid air cells particularly the mid to anterior ethmoid air cells.  There is mild mucosal thickening of the sphenoid sinus.  There is variable moderate to prominent mucosal thickening of the maxillary antra bilaterally as well as what appears to represent viscous fluid.  There is partial opacification of the visualized mastoid air cells bilaterally.    There is appearance thought to represent left vertebral dominance with termination of the right vertebral artery at the level of the right posterior inferior cerebellar artery.    The visualized osseous structures appear intact.

## 2025-01-30 NOTE — ANESTHESIA PREPROCEDURE EVALUATION
01/30/2025  Bere White is a 34 y.o., female.      Pre-op Assessment    I have reviewed the Patient Summary Reports.     I have reviewed the Nursing Notes. I have reviewed the NPO Status.   I have reviewed the Medications.     Review of Systems  Anesthesia Hx:  No problems with previous Anesthesia             Denies Family Hx of Anesthesia complications.    Denies Personal Hx of Anesthesia complications.                    Social:  Smoker IVDA. In withdrawal      Hematology/Oncology:  Hematology Normal   Oncology Normal                                   EENT/Dental:  EENT/Dental Normal           Cardiovascular:  Cardiovascular Normal Exercise tolerance: good                  History endocarditis                           Pulmonary:    Asthma                    Renal/:  Renal/ Normal                 Hepatic/GI:       Hepatitis, C              Musculoskeletal:  Musculoskeletal Normal                Neurological:  Neurology Normal                                      Endocrine:  Endocrine Normal            Dermatological:  Skin Normal    Psych:    depression schizophrenic               Physical Exam  General: Well nourished, Cooperative, Oriented and Alert    Airway:  Mallampati: II / II  Mouth Opening: Normal  TM Distance: Normal  Neck ROM: Normal ROM    Dental:  Intact        Anesthesia Plan  Type of Anesthesia, risks & benefits discussed:    Anesthesia Type: Gen ETT  Intra-op Monitoring Plan: Standard ASA Monitors  Post Op Pain Control Plan: multimodal analgesia  Induction:  IV  Airway Plan: Video and Direct  Informed Consent: Informed consent signed with the Patient and all parties understand the risks and agree with anesthesia plan.  All questions answered.   ASA Score: 3 Emergent  Day of Surgery Review of History & Physical: H&P Update referred to the surgeon/provider.  Anesthesia Plan Notes:  HIV+    Ready For Surgery From Anesthesia Perspective.     .

## 2025-01-30 NOTE — ANESTHESIA POSTPROCEDURE EVALUATION
Anesthesia Post Evaluation    Patient: Bere White    Procedure(s) Performed: Procedure(s) (LRB):  INCISION AND DRAINAGE, ABSCESS (Right)    Final Anesthesia Type: general      Patient location during evaluation: PACU  Patient participation: Yes- Able to Participate  Level of consciousness: awake and alert  Post-procedure vital signs: reviewed and stable  Pain management: adequate  Airway patency: patent    PONV status at discharge: No PONV  Anesthetic complications: no      Cardiovascular status: blood pressure returned to baseline  Respiratory status: unassisted  Hydration status: euvolemic  Follow-up not needed.              Vitals Value Taken Time   BP  01/30/25 1509   Temp  01/30/25 1509   Pulse 144 01/30/25 1437   Resp 18 01/30/25 1420   SpO2 77 % 01/30/25 1437   Vitals shown include unfiled device data.      No case tracking events are documented in the log.      Pain/Kristofer Score: Pain Rating Prior to Med Admin: 10 (1/30/2025  2:20 PM)  Pain Rating Post Med Admin: 10 (1/30/2025  2:20 PM)

## 2025-01-30 NOTE — TRANSFER OF CARE
"Anesthesia Transfer of Care Note    Patient: Bere White    Procedure(s) Performed: Procedure(s) (LRB):  INCISION AND DRAINAGE, ABSCESS (Right)    Patient location: PACU    Anesthesia Type: general    Transport from OR: Transported from OR on room air with adequate spontaneous ventilation    Post pain: pain needs to be addressed    Post assessment: no apparent anesthetic complications and tolerated procedure well    Post vital signs: stable    Level of consciousness: awake, alert and agitated    Nausea/Vomiting: no nausea/vomiting    Complications: none    Transfer of care protocol was followed      Last vitals: Visit Vitals  BP (!) 149/78 (BP Location: Right arm, Patient Position: Lying)   Pulse 60   Temp 36.7 °C (98 °F) (Oral)   Resp 16   Ht 5' 2" (1.575 m)   Wt 59 kg (130 lb 1.1 oz)   LMP  (LMP Unknown)   SpO2 96%   Breastfeeding No   BMI 23.79 kg/m²     "

## 2025-01-30 NOTE — OR NURSING
Patient arrived to PACU combative and wanting to go outside to smoke. Meds given per MD order.  Patient uncooperative for vital signs.  Ok per anesthesia     Security called to bedside to assist with patient's verbal threats    MD notified patient wants to leave AMA; zyprexa ordered and given     Patient transferred to ICU

## 2025-01-31 LAB
FLOW CYTOMETRY ANTIBODIES ANALYZED - TISSUE: NORMAL
FLOW CYTOMETRY COMMENT - TISSUE: NORMAL
FLOW CYTOMETRY INTERPRETATION - TISSUE: NORMAL
GRAM STN SPEC: NORMAL
GRAM STN SPEC: NORMAL
SPECIMEN TYPE - TISSUE: NORMAL

## 2025-01-31 NOTE — DISCHARGE SUMMARY
[facility]  Discharge Summary      Patient Name: Bere White  MRN: [id number]  TIANNA: [id number]  Patient Class: OP- Observation  Admission Date: 1/29/2025  Hospital Length of Stay: 0 days  Discharge Date and Time: 1/30/2025  3:59 PM  Attending Physician: Jigna att. providers found   Discharging Provider: HARRY Cowart MD  Primary Care Provider: Jigna, Primary Doctor    Primary Care Team: Networked reference to record PCT     HPI:   Ms. Bere White is a 34 y.o. female, with PMH of HIV (not on HAART), active IVDU (injects into neck), prior endocarditis, prior peritonsillar abscess vs. Raul's angina, who presented to [facility] ED on 01/30/2025 due to recurrent facial swelling.  She states she has been experiencing facial swelling on the right side of her face for the past 2 weeks.  She states the pain has been increasing, and she believes it needs to be drained.   She additionally notes another new swelling on the left side of the jaw that is smaller but also present.  She denies difficulty breathing, oral swelling, foul taste in mouth, drainage of purulent material into the mouth, or out of the skin, fevers, numbness, weakness.  She was evaluated in the ED with labs that showed no leukocytosis or left shift.  H&H were 11.6/33.7.  A metabolic panel showed no MACRINA or significant electrolyte abnormalities.  She was hep C, and HIV positive.  Maxillofacial CT showed a large complex fluid collection with irregular nodular thick walled enhancement in the right side of the neck at the right submandibular area consistent with an abscess possibly of dental origin.  There was additionally bilateral (right greater than left) soft tissue swelling throughout the neck.  Multiple prominent lymph nodes were seen as well as a hypodense focus in the right palatine tonsil concerning for right palatine tonsil abscess.  She was treated in the ED with vancomycin and Zosyn.  She was placed on  observation.        Procedure(s) (LRB):  INCISION AND DRAINAGE, ABSCESS (Right)      Hospital Course:   Admitted with submandibular and tonsillar abscesses in setting of substance use disorder and started broad spectrum antibiotic therapy and opiate withdrawal medications. ENT consulted and underwent I&D in OR 01/30. Postoperatively she was significantly agitated and received olanzapine with improvement but afterward reported she no longer wished to remain in hospital for further treatment. Encouraged continued treatment but she did not wish to remain in house. Packing placed with ENT removed and midline removed; she subsequently left AMA.     Goals of Care Treatment Preferences:  Code Status: Full Code      SDOH Screening:  The patient declined to be screened for utility difficulties, food insecurity, transport difficulties, housing insecurity, and interpersonal safety, so no concerns could be identified this admission.     Consults:   Consults (From admission, onward)          Status Ordering Provider     Inpatient consult to Midline team  Once        Provider:  (Not yet assigned)    AZAM August     Inpatient consult to ENT  Once        Provider:  Porfirio Wilson MD    Completed AZAM MEJÍA            * Submandibular abscess, right-sided  - Ms. Bere White presents with bilateral facial swelling right greater than left  - imaging is consistent with right submandibular abscess, of suspected dental origin   - the patient does endorse IV drug use, with injection sites in neck  - status post vancomycin, Zosyn in ED  - continue treatment with Unasyn per up-to-date recommendations  - consider consultation to [facility]      Schizophrenia  - continue home meds      IVDU (intravenous drug user)  - reports she is an active IV drug user with injection site into neck  - urine tox screen pending    HIV (human immunodeficiency virus infection)  - not currently on HAART  - formerly [facility]  - CD4, viral load ordered        Final Active Diagnoses:    Diagnosis Date Noted POA    PRINCIPAL PROBLEM:  Submandibular abscess, right-sided [K12.2] 01/30/2025 Yes    Schizophrenia [F20.9] 01/30/2025 Yes    HIV (human immunodeficiency virus infection) [Z21] 12/09/2021 Yes    IVDU (intravenous drug user) [F19.90] 07/01/2021 Yes      Problems Resolved During this Admission:       Discharged Condition: against medical advice    Disposition: Left Against Medical Adv*    Follow Up:   Follow-up Information       [facility] Schedule an appointment as soon as possible for a visit in 1 week(s).    Why: Walk in clinic  Contact information:  [email or web address] [place] [address] [phone]                           Patient Instructions:   No discharge procedures on file.    Significant Diagnostic Studies:   CBC:  Recent Labs   Lab 01/29/25 2106   WBC 6.16   HGB 11.6*   HCT 33.7*      GRAN 81.0*  5.0   LYMPH 13.1*  0.8*   MONO 4.7  0.3   EOS 0.0   BASO 0.02     BMP:  Recent Labs   Lab 01/29/25 2106   *   K 3.5   CL 99   CO2 26   BUN 15   CREATININE 0.7   GLU 97   CALCIUM 9.4     CMP:  Recent Labs   Lab 01/29/25 2106   *   K 3.5   CL 99   CO2 26   BUN 15   CREATININE 0.7   GLU 97   CALCIUM 9.4   ALKPHOS 146   AST 23   ALT 17   BILITOT 0.4   PROT 9.4*   ALBUMIN 2.9*   ANIONGAP 9     Imaging Results               CT Maxillofacial With Contrast (Final result)  Result time 01/30/25 02:15:36      Final result by Donald Hawkins MD (01/30/25 02:15:36)                   Impression:      Large complex fluid collection with irregular and nodular thick-walled enhancement seen within the right-side of the neck at the right submandibular location most consistent with abscess collection.  This is potentially of dental origin for which clinical and historical correlation is needed.    Prominent edema seen throughout the soft tissues of the neck, bilaterally, right greater  than left as well as submental space.    Multiple prominent and heterogeneous lymph nodes, with lymph nodes demonstrating hypodense character that may relate to necrotic or suppurative lymph nodes bilaterally.    Hypodense focus at the right palatine tonsil may relate to a small right palatine tonsil abscess.    Prominent dental changes, correlation for acute dental disease is needed.    Paranasal sinus and mastoid findings as above.    This report was flagged in Epic as abnormal.      Electronically signed by: Donald Hawkins  Date:    01/30/2025  Time:    02:15               Narrative:    EXAMINATION:  CT MAXILLOFACIAL WITH CONTRAST    CLINICAL HISTORY:  Maxillary/facial abscess;    TECHNIQUE:  CT examination of the face and orbits was performed after the administration of 75 mL Omnipaque 350 intravenous contrast.  Axial imaging, sagittal and coronal reconstruction imaging is submitted.    COMPARISON:  None    FINDINGS:  There is appearance of a complex fluid collection with irregular and somewhat nodular thick-walled enhancement seen within the soft tissues of the right submandibular region, appearing just anterior to the inferior aspect of the right submandibular gland, and just inferior to the level of the mandible on the right, this measures up to approximately 3.4 x 4.2 cm on axial imaging and approximately 3.6 cm in the craniocaudal dimension on coronal reconstruction imaging.  The appearance is most consistent with an abscess.    There is associated prominent edema throughout the soft tissues of the lower face and neck, including edema extending along the right submandibular gland, and along the right parotid gland, there is prominent edema seen within the submental soft tissues.  There are multiple prominent lymph nodes throughout the visualized upper neck bilaterally, multiple enhancing and heterogeneous lymph nodes, several of which demonstrate areas of heterogeneous central hypodense character that may  relate to necrotic or suppurative lymph nodes bilaterally.  This includes multiple lymph nodes extending along the inferior aspect of the field of view, deep to the right sternocleidomastoid muscle.  Prominent edema seen throughout the soft tissues.    There is appearance that may relate to mild edema along the retropharyngeal space without a well-defined fluid collection or abscess.  The visualized airway appears patent.  The epiglottis does not appear abnormally thickened.    In addition there is appearance of a hypodense finding associated with the right palatine tonsil, as seen on axial image 114 measuring approximately 8.1 x 7.2 mm in size that could represent a tonsillar abscess.    There is mild edema seen within the parapharyngeal space bilaterally without a large degree of edematous change or cystic collection of the parapharyngeal space.    There are prominent dental changes noted, multiple findings consistent with dental caries and areas of periapical lucency, this includes periapical lucency involving what appears to be the right mandibular 2nd bicuspid.  Therefore the potential that the large right-sided submandibular abscess is of dental origin is to be considered.    The globes, extraocular muscles and optic nerves of the orbits appear appropriate, there is no evidence for retrobulbar mass or cystic collection or abscess collection.  There is prominent opacification of the frontal sinus, there is moderate to prominent opacification of the ethmoid air cells particularly the mid to anterior ethmoid air cells.  There is mild mucosal thickening of the sphenoid sinus.  There is variable moderate to prominent mucosal thickening of the maxillary antra bilaterally as well as what appears to represent viscous fluid.  There is partial opacification of the visualized mastoid air cells bilaterally.    There is appearance thought to represent left vertebral dominance with termination of the right vertebral artery  at the level of the right posterior inferior cerebellar artery.    The visualized osseous structures appear intact.                                      Pending Diagnostic Studies:       Procedure Component Value Units Date/Time    Leukemia/Lymphoma Screen - Tissue Other (Specify Comments Below) (RIGHT LEVEL 1B INCISIONAL BIOPSY); Has a separate specimen been submitted and ordered for surgical pathology? Yes RIGHT LEVEL 1B INCISIONAL BIOPSY RIGHT LEVEL 1B INCISIONAL BIOPSY H... [[id number]] Collected: 01/30/25 1457    Order Status: Sent Lab Status: In process Updated: 01/30/25 1604    Specimen: Tissue     Specimen to Pathology, Surgery ENT [[id number]] Collected: 01/30/25 1502    Order Status: Sent Lab Status: In process Updated: 01/30/25 1701    Specimen: Tissue            Medications:  Unable to reconcile prior to leaving AMA    Indwelling Lines/Drains at time of discharge:   Lines/Drains/Airways       None                   Time spent on the discharge of patient: 35 minutes         HARRY Cowart MD  Department of Hospital Medicine  [facility] ([place])

## 2025-01-31 NOTE — HOSPITAL COURSE
Admitted with submandibular and tonsillar abscesses in setting of substance use disorder and started broad spectrum antibiotic therapy and opiate withdrawal medications. ENT consulted and underwent I&D in OR 01/30. Postoperatively she was significantly agitated and received olanzapine with improvement but afterward reported she no longer wished to remain in hospital for further treatment. Encouraged continued treatment but she did not wish to remain in house. Packing placed with ENT removed and midline removed; she subsequently left AMA.

## 2025-02-01 LAB
ACID FAST MOD KINY STN SPEC: NORMAL
MYCOBACTERIUM SPEC QL CULT: NORMAL

## 2025-02-03 LAB — BACTERIA SPEC AEROBE CULT: NO GROWTH

## 2025-02-04 LAB
BACTERIA BLD CULT: NORMAL
BACTERIA BLD CULT: NORMAL
COMMENT: NORMAL
FINAL PATHOLOGIC DIAGNOSIS: NORMAL
GROSS: NORMAL
Lab: NORMAL

## 2025-02-07 LAB — BACTERIA SPEC ANAEROBE CULT: NORMAL

## 2025-03-03 LAB — FUNGUS SPEC CULT: NORMAL

## (undated) DEVICE — NDL HYPO REG 25G X 1 1/2

## (undated) DEVICE — GLOVE BIOGEL SKINSENSE PI 7.0

## (undated) DEVICE — MANIFOLD 4 PORT

## (undated) DEVICE — GAUZE SPONGE 4X4 12PLY

## (undated) DEVICE — APPLICATOR CHLORAPREP ORN 26ML

## (undated) DEVICE — SEE MEDLINE ITEM 157216

## (undated) DEVICE — SEE L#120831

## (undated) DEVICE — DRESSING TRANS 4X4 TEGADERM

## (undated) DEVICE — PAD CAST 2 IN X 4YDS STERILE

## (undated) DEVICE — UNDERGLOVES BIOGEL PI SZ 7 LF

## (undated) DEVICE — SOL POVIDONE SCRUB IODINE 4 OZ

## (undated) DEVICE — POSITIONER IV ARMBOARD FOAM

## (undated) DEVICE — BANDAGE ELASTIC 2X5 VELCRO ST

## (undated) DEVICE — SEE MEDLINE ITEM 146308

## (undated) DEVICE — FORCEP BIPOLAR ADSON 1MM TIP

## (undated) DEVICE — GLOVE BIOGEL SKINSENSE PI 6.5

## (undated) DEVICE — COVER OVERHEAD SURG LT BLUE

## (undated) DEVICE — GOWN NONREINF SET-IN SLV XL

## (undated) DEVICE — STOCKINET 4INX48

## (undated) DEVICE — POSITIONER HEAD DONUT 9IN FOAM

## (undated) DEVICE — SUT 6/0 18IN PROLENE BL MO

## (undated) DEVICE — Device

## (undated) DEVICE — BANDAGE ELASTIC 3X5 VELCRO ST

## (undated) DEVICE — DRAPE EXTREMITY FULL FABRIC

## (undated) DEVICE — SEE MEDLINE ITEM 157173

## (undated) DEVICE — SUT ETHILON 3-0 PS2 18 BLK

## (undated) DEVICE — BLADE SURG STAINLESS STEEL #15

## (undated) DEVICE — SPONGE DERMACEA GAUZE 4X4

## (undated) DEVICE — GLOVE BIOGEL SKINSENSE PI 8.0

## (undated) DEVICE — DRESSING XEROFORM GAUZE 5X9

## (undated) DEVICE — PADDING CAST SPECIALIST 3X4YD

## (undated) DEVICE — DRESSING XEROFORM FOIL PK 1X8

## (undated) DEVICE — PACK BASIC

## (undated) DEVICE — SLING ARM COMFT NAVY BLU MED

## (undated) DEVICE — SEE MEDLINE ITEM 156955

## (undated) DEVICE — ALCOHOL 70% ISOP W/GREEN 16OZ

## (undated) DEVICE — SPLINT PLASTER FS 5IN X 30IN

## (undated) DEVICE — STAPLER SKIN ROTATING HEAD

## (undated) DEVICE — DRAPE T THYROID STERILE

## (undated) DEVICE — GLOVE SURG BIOGEL LATEX SZ 7.5

## (undated) DEVICE — POSITIONER HEEL FOAM CONVOLTD

## (undated) DEVICE — SEE MEDLINE ITEM 157131

## (undated) DEVICE — TOWEL OR DISP STRL BLUE 4/PK

## (undated) DEVICE — SUT 5 30IN ETHIBOND GRN BR

## (undated) DEVICE — SOL NORMAL USPCA 0.9%

## (undated) DEVICE — SUT BLU BR 3-0 W/DMD PT 3/8

## (undated) DEVICE — SUT VICRYL PLUS 3-0 SH 18IN

## (undated) DEVICE — PAD CAST SPECIALIST STRL 3

## (undated) DEVICE — ELECTRODE BLD EXT INSUL 1

## (undated) DEVICE — APPLIER LIGACLIP SM 9.38IN

## (undated) DEVICE — TAPE CURAD ELAS FOAM 3INX5.5YD

## (undated) DEVICE — DRESSING GZ SURGICOUNT 4X8

## (undated) DEVICE — SPONGE BULKEE II ABSRB 6X6.75

## (undated) DEVICE — DRESSING TELFA N ADH 3X8

## (undated) DEVICE — TRAY DRY SKIN SCRUB PREP

## (undated) DEVICE — SUT FIBERWIRE 0 38 W/NDL

## (undated) DEVICE — GLOVE BIOGEL SKINSENSE PI 7.5

## (undated) DEVICE — ELECTRODE REM PLYHSV RETURN 9

## (undated) DEVICE — SEE MEDLINE ITEM 157117

## (undated) DEVICE — DRAPE INSTR MAGNETIC 10X16IN

## (undated) DEVICE — CONTAINER SPEC OR STRL 4.5OZ

## (undated) DEVICE — BLADE SCALP OPHTL BEVEL STR

## (undated) DEVICE — SEE MEDLINE ITEM 152530

## (undated) DEVICE — PAD PREP 50/CA

## (undated) DEVICE — SUT VICRYL 3-0 27 SH

## (undated) DEVICE — UNDERGLOVE BIOGEL PI SZ 6.5 LF

## (undated) DEVICE — SEE MEDLINE ITEM 152522

## (undated) DEVICE — DRAIN PENRS SIL STRL .25X18IN

## (undated) DEVICE — SUT ETHILON 5-0 PS-2 18IN

## (undated) DEVICE — BANDAGE BULKEE II 2.25INX3YD

## (undated) DEVICE — BLADE SURG #15 CARBON STEEL

## (undated) DEVICE — TIP YANKAUERS BULB NO VENT

## (undated) DEVICE — CLOSURE SKIN STERI STRIP 1/2X4

## (undated) DEVICE — SEE MEDLINE ITEM 157116

## (undated) DEVICE — SYR 10CC LUER LOCK

## (undated) DEVICE — SEE MEDLINE ITEM 152622

## (undated) DEVICE — SPONGE KITTNER 1/4X 5/8 L STRL